# Patient Record
Sex: FEMALE | Race: WHITE | NOT HISPANIC OR LATINO | Employment: FULL TIME | URBAN - METROPOLITAN AREA
[De-identification: names, ages, dates, MRNs, and addresses within clinical notes are randomized per-mention and may not be internally consistent; named-entity substitution may affect disease eponyms.]

---

## 2024-03-01 ENCOUNTER — ANESTHESIA (OUTPATIENT)
Dept: SURGERY | Facility: MEDICAL CENTER | Age: 29
DRG: 369 | End: 2024-03-01
Payer: COMMERCIAL

## 2024-03-01 ENCOUNTER — ANESTHESIA EVENT (OUTPATIENT)
Dept: SURGERY | Facility: MEDICAL CENTER | Age: 29
DRG: 369 | End: 2024-03-01
Payer: COMMERCIAL

## 2024-03-01 ENCOUNTER — HOSPITAL ENCOUNTER (INPATIENT)
Facility: MEDICAL CENTER | Age: 29
LOS: 2 days | DRG: 369 | End: 2024-03-03
Attending: HOSPITALIST | Admitting: HOSPITALIST
Payer: COMMERCIAL

## 2024-03-01 DIAGNOSIS — K92.0 HEMATEMESIS WITH NAUSEA: ICD-10-CM

## 2024-03-01 DIAGNOSIS — R11.2 NAUSEA AND VOMITING, UNSPECIFIED VOMITING TYPE: ICD-10-CM

## 2024-03-01 DIAGNOSIS — K22.6 MALLORY-WEISS TEAR: ICD-10-CM

## 2024-03-01 PROBLEM — F20.9 SCHIZOPHRENIA (HCC): Status: ACTIVE | Noted: 2024-03-01

## 2024-03-01 PROBLEM — E87.29 HIGH ANION GAP METABOLIC ACIDOSIS: Status: ACTIVE | Noted: 2024-03-01

## 2024-03-01 PROBLEM — Z72.0 NICOTINE USE: Status: ACTIVE | Noted: 2024-03-01

## 2024-03-01 PROBLEM — D72.829 LEUKOCYTOSIS: Status: ACTIVE | Noted: 2024-03-01

## 2024-03-01 PROBLEM — R00.0 TACHYCARDIA: Status: ACTIVE | Noted: 2024-03-01

## 2024-03-01 PROBLEM — K22.3 ESOPHAGEAL RUPTURE: Status: ACTIVE | Noted: 2024-03-01

## 2024-03-01 PROBLEM — D62 ACUTE BLOOD LOSS ANEMIA: Status: ACTIVE | Noted: 2024-03-01

## 2024-03-01 PROBLEM — E83.42 HYPOMAGNESEMIA: Status: ACTIVE | Noted: 2024-03-01

## 2024-03-01 PROBLEM — R73.9 HYPERGLYCEMIA: Status: ACTIVE | Noted: 2024-03-01

## 2024-03-01 LAB
ERYTHROCYTE [DISTWIDTH] IN BLOOD BY AUTOMATED COUNT: 42.9 FL (ref 35.9–50)
ERYTHROCYTE [DISTWIDTH] IN BLOOD BY AUTOMATED COUNT: 43.4 FL (ref 35.9–50)
EST. AVERAGE GLUCOSE BLD GHB EST-MCNC: 88 MG/DL
EXTRA TUBE BLU BLU: NORMAL
HBA1C MFR BLD: 4.7 % (ref 4–5.6)
HCG UR QL: NEGATIVE
HCT VFR BLD AUTO: 29.1 % (ref 37–47)
HCT VFR BLD AUTO: 33.2 % (ref 37–47)
HGB BLD-MCNC: 10.2 G/DL (ref 12–16)
HGB BLD-MCNC: 11.6 G/DL (ref 12–16)
MCH RBC QN AUTO: 31.5 PG (ref 27–33)
MCH RBC QN AUTO: 31.7 PG (ref 27–33)
MCHC RBC AUTO-ENTMCNC: 34.9 G/DL (ref 32.2–35.5)
MCHC RBC AUTO-ENTMCNC: 35.1 G/DL (ref 32.2–35.5)
MCV RBC AUTO: 89.8 FL (ref 81.4–97.8)
MCV RBC AUTO: 90.7 FL (ref 81.4–97.8)
PLATELET # BLD AUTO: 234 K/UL (ref 164–446)
PLATELET # BLD AUTO: 239 K/UL (ref 164–446)
PMV BLD AUTO: 10.3 FL (ref 9–12.9)
PMV BLD AUTO: 10.4 FL (ref 9–12.9)
RBC # BLD AUTO: 3.24 M/UL (ref 4.2–5.4)
RBC # BLD AUTO: 3.66 M/UL (ref 4.2–5.4)
WBC # BLD AUTO: 14.1 K/UL (ref 4.8–10.8)
WBC # BLD AUTO: 23.1 K/UL (ref 4.8–10.8)

## 2024-03-01 PROCEDURE — 36415 COLL VENOUS BLD VENIPUNCTURE: CPT

## 2024-03-01 PROCEDURE — A9270 NON-COVERED ITEM OR SERVICE: HCPCS | Performed by: INTERNAL MEDICINE

## 2024-03-01 PROCEDURE — 160202 HCHG ENDO MINUTES - 1ST 30 MINS LEVEL 3: Performed by: INTERNAL MEDICINE

## 2024-03-01 PROCEDURE — 85027 COMPLETE CBC AUTOMATED: CPT

## 2024-03-01 PROCEDURE — 83036 HEMOGLOBIN GLYCOSYLATED A1C: CPT

## 2024-03-01 PROCEDURE — 160002 HCHG RECOVERY MINUTES (STAT): Performed by: INTERNAL MEDICINE

## 2024-03-01 PROCEDURE — 160048 HCHG OR STATISTICAL LEVEL 1-5: Performed by: INTERNAL MEDICINE

## 2024-03-01 PROCEDURE — 160009 HCHG ANES TIME/MIN: Performed by: INTERNAL MEDICINE

## 2024-03-01 PROCEDURE — 99222 1ST HOSP IP/OBS MODERATE 55: CPT | Mod: 25 | Performed by: INTERNAL MEDICINE

## 2024-03-01 PROCEDURE — 0DJ08ZZ INSPECTION OF UPPER INTESTINAL TRACT, VIA NATURAL OR ARTIFICIAL OPENING ENDOSCOPIC: ICD-10-PCS | Performed by: INTERNAL MEDICINE

## 2024-03-01 PROCEDURE — 160035 HCHG PACU - 1ST 60 MINS PHASE I: Performed by: INTERNAL MEDICINE

## 2024-03-01 PROCEDURE — 700105 HCHG RX REV CODE 258: Performed by: HOSPITALIST

## 2024-03-01 PROCEDURE — 700101 HCHG RX REV CODE 250: Performed by: ANESTHESIOLOGY

## 2024-03-01 PROCEDURE — 700111 HCHG RX REV CODE 636 W/ 250 OVERRIDE (IP): Mod: JZ | Performed by: HOSPITALIST

## 2024-03-01 PROCEDURE — 700111 HCHG RX REV CODE 636 W/ 250 OVERRIDE (IP): Performed by: ANESTHESIOLOGY

## 2024-03-01 PROCEDURE — C9113 INJ PANTOPRAZOLE SODIUM, VIA: HCPCS | Performed by: HOSPITALIST

## 2024-03-01 PROCEDURE — 43235 EGD DIAGNOSTIC BRUSH WASH: CPT | Performed by: INTERNAL MEDICINE

## 2024-03-01 PROCEDURE — 770020 HCHG ROOM/CARE - TELE (206)

## 2024-03-01 PROCEDURE — 700102 HCHG RX REV CODE 250 W/ 637 OVERRIDE(OP): Performed by: INTERNAL MEDICINE

## 2024-03-01 PROCEDURE — 81025 URINE PREGNANCY TEST: CPT

## 2024-03-01 PROCEDURE — 99223 1ST HOSP IP/OBS HIGH 75: CPT | Performed by: HOSPITALIST

## 2024-03-01 RX ORDER — ONDANSETRON 2 MG/ML
4 INJECTION INTRAMUSCULAR; INTRAVENOUS EVERY 4 HOURS PRN
Status: DISCONTINUED | OUTPATIENT
Start: 2024-03-01 | End: 2024-03-03 | Stop reason: HOSPADM

## 2024-03-01 RX ORDER — SODIUM CHLORIDE, SODIUM LACTATE, POTASSIUM CHLORIDE, CALCIUM CHLORIDE 600; 310; 30; 20 MG/100ML; MG/100ML; MG/100ML; MG/100ML
INJECTION, SOLUTION INTRAVENOUS CONTINUOUS
Status: ACTIVE | OUTPATIENT
Start: 2024-03-01 | End: 2024-03-01

## 2024-03-01 RX ORDER — SUCRALFATE ORAL 1 G/10ML
1 SUSPENSION ORAL EVERY 6 HOURS
Status: DISCONTINUED | OUTPATIENT
Start: 2024-03-01 | End: 2024-03-03 | Stop reason: HOSPADM

## 2024-03-01 RX ORDER — ONDANSETRON 2 MG/ML
INJECTION INTRAMUSCULAR; INTRAVENOUS PRN
Status: DISCONTINUED | OUTPATIENT
Start: 2024-03-01 | End: 2024-03-01 | Stop reason: SURG

## 2024-03-01 RX ORDER — LABETALOL HYDROCHLORIDE 5 MG/ML
5 INJECTION, SOLUTION INTRAVENOUS
Status: DISCONTINUED | OUTPATIENT
Start: 2024-03-01 | End: 2024-03-01 | Stop reason: HOSPADM

## 2024-03-01 RX ORDER — ONDANSETRON 2 MG/ML
4 INJECTION INTRAMUSCULAR; INTRAVENOUS EVERY 4 HOURS PRN
Status: DISCONTINUED | OUTPATIENT
Start: 2024-03-01 | End: 2024-03-01

## 2024-03-01 RX ORDER — DIPHENHYDRAMINE HYDROCHLORIDE 50 MG/ML
12.5 INJECTION INTRAMUSCULAR; INTRAVENOUS
Status: DISCONTINUED | OUTPATIENT
Start: 2024-03-01 | End: 2024-03-01 | Stop reason: HOSPADM

## 2024-03-01 RX ORDER — DEXAMETHASONE SODIUM PHOSPHATE 4 MG/ML
INJECTION, SOLUTION INTRA-ARTICULAR; INTRALESIONAL; INTRAMUSCULAR; INTRAVENOUS; SOFT TISSUE PRN
Status: DISCONTINUED | OUTPATIENT
Start: 2024-03-01 | End: 2024-03-01 | Stop reason: SURG

## 2024-03-01 RX ORDER — HYDRALAZINE HYDROCHLORIDE 20 MG/ML
10 INJECTION INTRAMUSCULAR; INTRAVENOUS EVERY 4 HOURS PRN
Status: DISCONTINUED | OUTPATIENT
Start: 2024-03-01 | End: 2024-03-03 | Stop reason: HOSPADM

## 2024-03-01 RX ORDER — MIDAZOLAM HYDROCHLORIDE 1 MG/ML
1 INJECTION INTRAMUSCULAR; INTRAVENOUS
Status: DISCONTINUED | OUTPATIENT
Start: 2024-03-01 | End: 2024-03-01 | Stop reason: HOSPADM

## 2024-03-01 RX ORDER — LABETALOL HYDROCHLORIDE 5 MG/ML
10 INJECTION, SOLUTION INTRAVENOUS EVERY 4 HOURS PRN
Status: ACTIVE | OUTPATIENT
Start: 2024-03-01 | End: 2024-03-01

## 2024-03-01 RX ORDER — PROCHLORPERAZINE EDISYLATE 5 MG/ML
5-10 INJECTION INTRAMUSCULAR; INTRAVENOUS EVERY 4 HOURS PRN
Status: DISCONTINUED | OUTPATIENT
Start: 2024-03-01 | End: 2024-03-03 | Stop reason: HOSPADM

## 2024-03-01 RX ORDER — OXYCODONE HCL 5 MG/5 ML
5 SOLUTION, ORAL ORAL
Status: DISCONTINUED | OUTPATIENT
Start: 2024-03-01 | End: 2024-03-01 | Stop reason: HOSPADM

## 2024-03-01 RX ORDER — LIDOCAINE HYDROCHLORIDE 20 MG/ML
INJECTION, SOLUTION EPIDURAL; INFILTRATION; INTRACAUDAL; PERINEURAL PRN
Status: DISCONTINUED | OUTPATIENT
Start: 2024-03-01 | End: 2024-03-01 | Stop reason: SURG

## 2024-03-01 RX ORDER — PROMETHAZINE HYDROCHLORIDE 12.5 MG/1
12.5-25 SUPPOSITORY RECTAL EVERY 4 HOURS PRN
Status: DISCONTINUED | OUTPATIENT
Start: 2024-03-01 | End: 2024-03-03 | Stop reason: HOSPADM

## 2024-03-01 RX ORDER — SODIUM CHLORIDE, SODIUM LACTATE, POTASSIUM CHLORIDE, CALCIUM CHLORIDE 600; 310; 30; 20 MG/100ML; MG/100ML; MG/100ML; MG/100ML
INJECTION, SOLUTION INTRAVENOUS CONTINUOUS
Status: DISCONTINUED | OUTPATIENT
Start: 2024-03-01 | End: 2024-03-01 | Stop reason: HOSPADM

## 2024-03-01 RX ORDER — SODIUM CHLORIDE 9 MG/ML
INJECTION, SOLUTION INTRAVENOUS CONTINUOUS
Status: DISCONTINUED | OUTPATIENT
Start: 2024-03-01 | End: 2024-03-02

## 2024-03-01 RX ORDER — ONDANSETRON 2 MG/ML
4 INJECTION INTRAMUSCULAR; INTRAVENOUS
Status: DISCONTINUED | OUTPATIENT
Start: 2024-03-01 | End: 2024-03-01 | Stop reason: HOSPADM

## 2024-03-01 RX ORDER — ONDANSETRON 4 MG/1
4 TABLET, ORALLY DISINTEGRATING ORAL EVERY 4 HOURS PRN
Status: DISCONTINUED | OUTPATIENT
Start: 2024-03-01 | End: 2024-03-03 | Stop reason: HOSPADM

## 2024-03-01 RX ORDER — OXYCODONE HCL 5 MG/5 ML
10 SOLUTION, ORAL ORAL
Status: DISCONTINUED | OUTPATIENT
Start: 2024-03-01 | End: 2024-03-01 | Stop reason: HOSPADM

## 2024-03-01 RX ORDER — EPHEDRINE SULFATE 50 MG/ML
5 INJECTION, SOLUTION INTRAVENOUS
Status: DISCONTINUED | OUTPATIENT
Start: 2024-03-01 | End: 2024-03-01 | Stop reason: HOSPADM

## 2024-03-01 RX ORDER — MAGNESIUM SULFATE HEPTAHYDRATE 40 MG/ML
2 INJECTION, SOLUTION INTRAVENOUS ONCE
Status: COMPLETED | OUTPATIENT
Start: 2024-03-01 | End: 2024-03-01

## 2024-03-01 RX ORDER — HYDRALAZINE HYDROCHLORIDE 20 MG/ML
5 INJECTION INTRAMUSCULAR; INTRAVENOUS
Status: DISCONTINUED | OUTPATIENT
Start: 2024-03-01 | End: 2024-03-01 | Stop reason: HOSPADM

## 2024-03-01 RX ORDER — MIDAZOLAM HYDROCHLORIDE 1 MG/ML
INJECTION INTRAMUSCULAR; INTRAVENOUS PRN
Status: DISCONTINUED | OUTPATIENT
Start: 2024-03-01 | End: 2024-03-01 | Stop reason: SURG

## 2024-03-01 RX ORDER — MEPERIDINE HYDROCHLORIDE 25 MG/ML
6.25 INJECTION INTRAMUSCULAR; INTRAVENOUS; SUBCUTANEOUS
Status: DISCONTINUED | OUTPATIENT
Start: 2024-03-01 | End: 2024-03-01 | Stop reason: HOSPADM

## 2024-03-01 RX ORDER — ROCURONIUM BROMIDE 10 MG/ML
INJECTION, SOLUTION INTRAVENOUS PRN
Status: DISCONTINUED | OUTPATIENT
Start: 2024-03-01 | End: 2024-03-01 | Stop reason: SURG

## 2024-03-01 RX ADMIN — PROPOFOL 200 MG: 10 INJECTION, EMULSION INTRAVENOUS at 16:43

## 2024-03-01 RX ADMIN — PIPERACILLIN AND TAZOBACTAM 3.38 G: 3; .375 INJECTION, POWDER, FOR SOLUTION INTRAVENOUS at 15:13

## 2024-03-01 RX ADMIN — LIDOCAINE HYDROCHLORIDE 50 MG: 20 INJECTION, SOLUTION EPIDURAL; INFILTRATION; INTRACAUDAL at 16:43

## 2024-03-01 RX ADMIN — FENTANYL CITRATE 50 MCG: 50 INJECTION, SOLUTION INTRAMUSCULAR; INTRAVENOUS at 16:43

## 2024-03-01 RX ADMIN — MIDAZOLAM HYDROCHLORIDE 2 MG: 1 INJECTION, SOLUTION INTRAMUSCULAR; INTRAVENOUS at 16:37

## 2024-03-01 RX ADMIN — SODIUM CHLORIDE: 9 INJECTION, SOLUTION INTRAVENOUS at 14:55

## 2024-03-01 RX ADMIN — MAGNESIUM SULFATE HEPTAHYDRATE 2 G: 2 INJECTION, SOLUTION INTRAVENOUS at 15:26

## 2024-03-01 RX ADMIN — DEXAMETHASONE SODIUM PHOSPHATE 8 MG: 4 INJECTION INTRA-ARTICULAR; INTRALESIONAL; INTRAMUSCULAR; INTRAVENOUS; SOFT TISSUE at 16:44

## 2024-03-01 RX ADMIN — PANTOPRAZOLE SODIUM 8 MG/HR: 40 INJECTION, POWDER, FOR SOLUTION INTRAVENOUS at 14:58

## 2024-03-01 RX ADMIN — ROCURONIUM BROMIDE 30 MG: 50 INJECTION, SOLUTION INTRAVENOUS at 16:43

## 2024-03-01 RX ADMIN — ONDANSETRON 4 MG: 2 INJECTION INTRAMUSCULAR; INTRAVENOUS at 16:44

## 2024-03-01 RX ADMIN — SUCRALFATE ORAL 1 G: 1 SUSPENSION ORAL at 18:34

## 2024-03-01 ASSESSMENT — LIFESTYLE VARIABLES
TOTAL SCORE: 0
HAVE YOU EVER FELT YOU SHOULD CUT DOWN ON YOUR DRINKING: NO
AVERAGE NUMBER OF DAYS PER WEEK YOU HAVE A DRINK CONTAINING ALCOHOL: 1
EVER HAD A DRINK FIRST THING IN THE MORNING TO STEADY YOUR NERVES TO GET RID OF A HANGOVER: NO
HAVE PEOPLE ANNOYED YOU BY CRITICIZING YOUR DRINKING: NO
EVER FELT BAD OR GUILTY ABOUT YOUR DRINKING: NO
CONSUMPTION TOTAL: POSITIVE
TOTAL SCORE: 0
HOW MANY TIMES IN THE PAST YEAR HAVE YOU HAD 5 OR MORE DRINKS IN A DAY: 3
DOES PATIENT WANT TO STOP DRINKING: NO
ALCOHOL_USE: YES
ON A TYPICAL DAY WHEN YOU DRINK ALCOHOL HOW MANY DRINKS DO YOU HAVE: 2
TOTAL SCORE: 0
SUBSTANCE_ABUSE: 0

## 2024-03-01 ASSESSMENT — ENCOUNTER SYMPTOMS
PHOTOPHOBIA: 0
TINGLING: 0
DOUBLE VISION: 0
EYE DISCHARGE: 0
RESPIRATORY NEGATIVE: 1
BACK PAIN: 0
CHILLS: 0
SHORTNESS OF BREATH: 0
MUSCULOSKELETAL NEGATIVE: 1
PSYCHIATRIC NEGATIVE: 1
SPUTUM PRODUCTION: 0
CONSTITUTIONAL NEGATIVE: 1
BLOOD IN STOOL: 0
PND: 0
ORTHOPNEA: 0
FEVER: 0
POLYDIPSIA: 0
NEUROLOGICAL NEGATIVE: 1
DIZZINESS: 0
MYALGIAS: 0
SINUS PAIN: 0
NAUSEA: 1
WHEEZING: 0
DIAPHORESIS: 0
FALLS: 0
SENSORY CHANGE: 0
STRIDOR: 0
HEARTBURN: 1
EYES NEGATIVE: 1
COUGH: 0
LOSS OF CONSCIOUSNESS: 0
BRUISES/BLEEDS EASILY: 0
FLANK PAIN: 0
VOMITING: 1
INSOMNIA: 0
EYE REDNESS: 0
CARDIOVASCULAR NEGATIVE: 1
SEIZURES: 0
NECK PAIN: 1
DIZZINESS: 1
ABDOMINAL PAIN: 0
DEPRESSION: 0
FOCAL WEAKNESS: 0

## 2024-03-01 ASSESSMENT — COGNITIVE AND FUNCTIONAL STATUS - GENERAL
DAILY ACTIVITIY SCORE: 24
SUGGESTED CMS G CODE MODIFIER DAILY ACTIVITY: CH
MOBILITY SCORE: 24
SUGGESTED CMS G CODE MODIFIER MOBILITY: CH

## 2024-03-01 ASSESSMENT — PAIN DESCRIPTION - PAIN TYPE
TYPE: SURGICAL PAIN
TYPE: ACUTE PAIN

## 2024-03-01 ASSESSMENT — PATIENT HEALTH QUESTIONNAIRE - PHQ9
2. FEELING DOWN, DEPRESSED, IRRITABLE, OR HOPELESS: NOT AT ALL
1. LITTLE INTEREST OR PLEASURE IN DOING THINGS: NOT AT ALL
SUM OF ALL RESPONSES TO PHQ9 QUESTIONS 1 AND 2: 0

## 2024-03-01 ASSESSMENT — VISUAL ACUITY: OU: 1

## 2024-03-01 NOTE — ASSESSMENT & PLAN NOTE
Leukocytosis of 22,000  This could be from a stress response simultaneously with an ulcer versus a Raina-Tamayo tear.  Was started on Zosyn and monitor white blood cell count  Follow-up on blood cultures [place] [facility] [place]  Dc antibiotics, no other signs of infection, suspect reactive

## 2024-03-01 NOTE — ANESTHESIA PREPROCEDURE EVALUATION
Case: [id number] Date/Time: 03/01/24 1600    Procedure: GASTROSCOPY (Esophagus)    Anesthesia type: MAC    Pre-op diagnosis: GI bleed    Location: [facility] ROOM 26 / SURGERY SAME DAY [facility]    Surgeons: Mervat Mcdonald M.D.            Relevant Problems   ANESTHESIA (within normal limits)      PULMONARY (within normal limits)      NEURO (within normal limits)      CARDIAC (within normal limits)      GI (within normal limits)       (within normal limits)      ENDO (within normal limits)      Other   (positive) Acute blood loss anemia   (positive) Esophageal rupture   (positive) Hematemesis   (positive) High anion gap metabolic acidosis   (positive) Leukocytosis   (positive) Nausea and vomiting   (positive) Nicotine use   (positive) Schizophrenia (HCC)   (positive) Tachycardia       Physical Exam    Airway   Mallampati: II  TM distance: >3 FB  Neck ROM: full       Cardiovascular - normal exam  Rhythm: regular  Rate: normal  (-) murmur     Dental - normal exam           Pulmonary - normal exam  Breath sounds clear to auscultation     Abdominal    Neurological - normal exam                   Anesthesia Plan    ASA 2- EMERGENT   ASA physical status emergent criteria: acute hemorrhage    Plan - general       Airway plan will be ETT          Induction: intravenous    Postoperative Plan: Postoperative administration of opioids is intended.    Pertinent diagnostic labs and testing reviewed    Informed Consent:    Anesthetic plan and risks discussed with patient.    Use of blood products discussed with: patient whom consented to blood products.

## 2024-03-01 NOTE — CONSULTS
Gastroenterology Initial Consult Note               Author:  Mervat Mcdonald M.D. Date & Time Created: 3/1/2024 1:47 PM       Patient ID:  Name:             Sunita Thornton    YOB: 1995  Age:                 28 y.o.  female  MRN:               [id number]      Referring Provider:  Tim Gonzalez MD        Presenting Chief Complaint:  GI bleed       History of Present Illness:    This is a very pleasant 28 y.o. female presented to [facility] [place] this am with vomiting overnight.  She was eating lunch yesterday and then vomited--not violently.  Shortly after, she had hematemesis about every 2 hours.  Around midnight she had melena.  She denies epigastric tenderness.  This morning she was dizzy and elected to go to the ER.  On the way she had another hematemesis.  She has been NPO since 0930.  In the ER, She was found to have tachycardia to 140 and BP 85/46.  She was given 1 L NS and 1 unit PRBC.  Hgb 11.6, BUN 39      She has no chest or abd pain.  She takes no NSAIDs.  No change in appetite or early satiety.  She does admit to worsening GERD but hasn't been taking any thing.  She drinks alcohol infrequently.    Review of Systems:  Review of Systems   Constitutional:  Positive for malaise/fatigue.   HENT: Negative.     Eyes: Negative.    Respiratory: Negative.     Cardiovascular: Negative.    Gastrointestinal:  Positive for heartburn, melena, nausea and vomiting.   Genitourinary: Negative.    Musculoskeletal: Negative.    Skin: Negative.    Neurological:  Positive for dizziness. Negative for seizures and loss of consciousness.   Endo/Heme/Allergies: Negative.              Past Medical History:  No past medical history on file.  Active Hospital Problems    Diagnosis     Esophageal rupture [K22.3]     Nausea and vomiting [R11.2]     Hematemesis [K92.0]     Schizophrenia (HCC) [F20.9]     Leukocytosis [D72.829]     Tachycardia [R00.0]     Hypomagnesemia [E83.42]     High anion gap metabolic acidosis  [E87.29]     Hyperglycemia [R73.9]     Acute blood loss anemia [D62]     Nicotine use [Z72.0]          Past Surgical History:  No past surgical history on file.        Hospital Medications:  Current Facility-Administered Medications   Medication Dose Frequency Provider Last Rate Last Admin    labetalol (Normodyne/Trandate) injection 10 mg  10 mg Q4HRS PRN Dave Gonzalez M.D.        NS infusion   Continuous Dave Gonzalez M.D.        hydrALAZINE (Apresoline) injection 10 mg  10 mg Q4HRS PRN Dave Gonzalez M.D.        ondansetron (Zofran) syringe/vial injection 4 mg  4 mg Q4HRS PRN Dave Gonzalez M.D.        ondansetron (Zofran ODT) dispertab 4 mg  4 mg Q4HRS PRN Dave Gonzalez M.D.        promethazine (Phenergan) suppository 12.5-25 mg  12.5-25 mg Q4HRS PRN Dave Gonzalez M.D.        prochlorperazine (Compazine) injection 5-10 mg  5-10 mg Q4HRS PRN Dave Gonzalez M.D.        fentaNYL (Sublimaze) injection 50 mcg  50 mcg Q HOUR PRN Dave Gonzalez M.D.        fentaNYL (Sublimaze) injection 25 mcg  25 mcg Q HOUR PRN Dave Gonzalez M.D.        pantoprazole (Protonix) 80 mg in  mL continuous infusion  8 mg/hr Continuous Dave Gonzalez M.D.        [START ON 3/2/2024] cariprazine (Vraylar) capsule 3 mg  3 mg DAILY Dave Gonzalez M.D.        magnesium sulfate IVPB premix 2 g  2 g Once Dave Gonzalez M.D.        piperacillin-tazobactam (Zosyn) 3.375 g in  mL IVPB  3.375 g Once Dave Gonzalez M.D.        And    piperacillin-tazobactam (Zosyn) 3.375 g in  mL IVPB  3.375 g Q8HRS Dave Gonzalez M.D.       Last reviewed on 3/1/2024  1:09 PM by Dave Gonzalez M.D.       Current Outpatient Medications:  Medications Prior to Admission   Medication Sig Dispense Refill Last Dose    cariprazine (VRAYLAR) 3 MG capsule Take 3 mg by mouth every day.            Medication Allergies:  Not on File      Family Medical History:  No family history on file.      Social History:  Social History     Socioeconomic History     Marital status: Not on file     Spouse name: Not on file    Number of children: Not on file    Years of education: Not on file    Highest education level: Not on file   Occupational History    Not on file   Tobacco Use    Smoking status: Not on file    Smokeless tobacco: Not on file   Substance and Sexual Activity    Alcohol use: Not on file    Drug use: Not on file    Sexual activity: Not on file   Other Topics Concern    Not on file   Social History Narrative    Not on file     Social Determinants of Health     Financial Resource Strain: Not on file   Food Insecurity: Not on file   Transportation Needs: Not on file   Physical Activity: Not on file   Stress: Not on file   Social Connections: Not on file   Intimate Partner Violence: Not on file   Housing Stability: Not on file         Vital signs:  Weight/BMI: There is no height or weight on file to calculate BMI.  BP (!) 141/84   Pulse 95   Temp 36.7 °C (98.1 °F) (Temporal)   Resp 18   Wt 74.2 kg (163 lb 9.3 oz)   SpO2 99%   Vitals:    03/01/24 1312 03/01/24 1319   BP:  (!) 141/84   Pulse:  95   Resp:  18   Temp:  36.7 °C (98.1 °F)   TempSrc:  Temporal   SpO2:  99%   Weight: 74.2 kg (163 lb 9.3 oz)      Oxygen Therapy:  Pulse Oximetry: 99 %, O2 (LPM): 0, O2 Delivery Device: None - Room Air    Intake/Output Summary (Last 24 hours) at 3/1/2024 1347  Last data filed at 3/1/2024 1319  Gross per 24 hour   Intake --   Output 450 ml   Net -450 ml         Physical Exam:  Physical Exam  Constitutional:       Appearance: Normal appearance.   HENT:      Head: Normocephalic and atraumatic.      Nose: Nose normal.   Eyes:      General: No scleral icterus.     Pupils: Pupils are equal, round, and reactive to light.   Cardiovascular:      Rate and Rhythm: Regular rhythm.      Heart sounds: Normal heart sounds.   Pulmonary:      Effort: Pulmonary effort is normal.      Breath sounds: Normal breath sounds.   Abdominal:      General: Bowel sounds are normal.      Palpations:  "Abdomen is soft.      Tenderness: There is no abdominal tenderness.   Musculoskeletal:         General: Normal range of motion.      Cervical back: Normal range of motion and neck supple.   Skin:     General: Skin is warm and dry.   Neurological:      Mental Status: She is alert and oriented to person, place, and time.                 Labs:  No results for input(s): \"SODIUM\", \"POTASSIUM\", \"CHLORIDE\", \"CO2\", \"BUN\", \"CREATININE\", \"MAGNESIUM\", \"PHOSPHORUS\", \"CALCIUM\" in the last 72 hours.  No results for input(s): \"ALTSGPT\", \"ASTSGOT\", \"ALKPHOSPHAT\", \"TBILIRUBIN\", \"DBILIRUBIN\", \"GAMMAGT\", \"AMYLASE\", \"LIPASE\", \"ALB\", \"PREALBUMIN\", \"GLUCOSE\" in the last 72 hours.      No results for input(s): \"RBC\", \"HEMOGLOBIN\", \"HEMATOCRIT\", \"PLATELETCT\", \"PROTHROMBTM\", \"APTT\", \"INR\", \"IRON\", \"FERRITIN\", \"TOTIRONBC\" in the last 72 hours.  No results found for this or any previous visit (from the past 24 hour(s)).      Radiology Review:  No orders to display         MDM (Data Review):   -Records reviewed and summarized in current documentation  -I personally reviewed and interpreted the laboratory results  -I personally reviewed the radiology images    Assessment/Recommendations:    Hematemesis:  ? Due to GERD  Acute blood loss anemia  GERD  Schizophrenia    Recs:  NPO  IVF  IV Protonix  EGD today.          Mervat Mcdonald M.D.          Core Quality Measures   Reviewed items:  Labs, Medications and Radiology reports reviewed         "

## 2024-03-01 NOTE — ASSESSMENT & PLAN NOTE
Profound nausea vomiting that happened yesterday.  She says she has been vomiting all night long at least 10 times she has vomited every time it is blood.  Antiemetic management  Tolerating full liquid diet, stop IVF

## 2024-03-01 NOTE — ASSESSMENT & PLAN NOTE
Secondary to the hematemesis patient has developed anemia   Continue to monitor H/H   Hgb ~9.5, stable x 2

## 2024-03-01 NOTE — ASSESSMENT & PLAN NOTE
Discussed nicotine use with her.  Patient says she vapes all the time.  She says she has been trying to cut back  Discussed nicotine replacement protocol with her currently she is not interested.  Discussed medication management for withdrawal as she states she is not interested at this time  Discussed acupuncture and she said most likely her insurance is not going to cover that.  Also the place she lives and has minimal resources.

## 2024-03-01 NOTE — ASSESSMENT & PLAN NOTE
Suspected esophageal rupture although this could be from gastric ulcer disease as well.  Patient tells me she has had heartburn for the past 2 months  She reports sudden vomiting of blood yesterday that came after she ate some tacos.  Patient has a not reported vomiting prior to this and did not report had any vomiting episodes.  H&H stable ~9  EGD without rupture, Raina-Tamayo tear at GE junction   PPI BID, full liquid diet and Carafate slurry

## 2024-03-01 NOTE — ASSESSMENT & PLAN NOTE
Tachycardia most likely SIRS response with elevated white blood cell count  Initiateed antibiotics and fluid resuscitation  HR stable   Stop IVF, no signs of infection, stop antibiotics and monitor

## 2024-03-01 NOTE — ASSESSMENT & PLAN NOTE
Magnesium 1.6 at the sending facility and thus we will give her 2 g of IV magnesium recheck magnesium level in the morning.

## 2024-03-01 NOTE — PROGRESS NOTES
[facility] TRI[facility] OFFICER CONSULT REQUEST REPORT  Consult requested by: Dr Acharya  Reason for consult: Suspected esophageal rupture with severe nausea vomiting  Is consult for transition of care: Yes  Pertinent history/hospital Course: Patient with no significant alcohol history, no tobacco history, no drug history has started to have severe nausea vomiting.  The patient afterwards started to vomit blood.  Blood counts at this point have decreased but are still stable at 11.  Patient suspected to have esophageal rupture with white blood cell count up to 22,000 and a heart rate up to 120.  Patient will be placed on broad-spectrum antibiotics with blood cultures being taken prior to transfer as discussed with the transferring physician.  GI has been consulted, Dr. Mcdonald to see the patient on arrival.  Patient currently on Protonix drip and octreotide drip.  Code Status: Full code  Can the hospitalist sign off upon completion of required consult/outcomes requested: No   Assigned hospitalist: Call transfer center upon arrival for assignment    For any question or concerns regarding the care of this patient, please reach out to the assigned hospitalist.

## 2024-03-01 NOTE — ASSESSMENT & PLAN NOTE
Sudden onset of hematemesis prior to arrival   Patient tells me she has had heartburn for several months  S/p EGD with mauro canales   Stop PPI infusion, start IV PPI BID   Full liquid diet, carafate slurry and only crushed meds, discussed with GI  Monitor H/H, stable ~9.5

## 2024-03-01 NOTE — ASSESSMENT & PLAN NOTE
Secondary to the nausea vomiting patient has become acidotic and thus has developed metabolic acidosis with high anion gap  Monitor bicarb level as well as anion gap level and give fluid resuscitation  Hyperchloremic acidosis   Stop NS, pt tolerating full liquid diet   Repeat BMP in am

## 2024-03-01 NOTE — H&P
Hospital Medicine History & Physical Note    Date of Service  3/1/2024    Primary Care Physician  No primary care provider on file.    Consultants  GI    Specialist Names: Dr. Mcdonald    Code Status  Full Code    Chief Complaint  No chief complaint on file.      History of Presenting Illness  Sunita Thornton is a 28 y.o. female who presented 3/1/2024 on transfer from [facility] after the patient reported there for hematemesis.  The patient had some tacos last night right after which she vomited blood.  During the night she vomited at least 9 times she says every time it is blood.  He said there was more blood in the toilet and water.  She has no pain with it.  She has been having gastric complaints with heartburn over the past 2 months.  Certainly the potential for having gastric ulcers is there.  Patient does not drink alcohol.  She does not use NSAIDs.  He only takes 1 medication for schizophrenia.  That is Vraylar.  Side effects of this medication can be dyspepsia, nausea, vomiting.  Patient at this point has been started on Protonix drip.  She will continue with fluid resuscitation given her metabolic acidosis with it.  She has a white blood cell count of 22,000 and thus we will initiate her on antibiotics with Zosyn to cover any potential leak.  Patient will need an EGD as discussed with GI.  N.p.o. for now and patient may need to go for EGD this afternoon.  At the sending facility patient was stabilized with 1 L of fluid, 1 unit of packed red blood cells, she did not have any imaging done, she did have laboratory evaluations done, blood cultures were taken and she was started on antibiotics and Protonix.    I discussed the plan of care with patient, bedside RN, and transferring physician and gastroenterology. .    Review of Systems  Review of Systems   Constitutional: Negative.  Negative for chills, diaphoresis and fever.   HENT: Negative.  Negative for nosebleeds and sinus pain.    Eyes:  Negative.  Negative for double vision, photophobia, discharge and redness.   Respiratory: Negative.  Negative for cough, sputum production, shortness of breath, wheezing and stridor.    Cardiovascular: Negative.  Negative for chest pain, orthopnea, leg swelling and PND.   Gastrointestinal:  Positive for heartburn, nausea and vomiting. Negative for abdominal pain and blood in stool.   Genitourinary: Negative.  Negative for dysuria, flank pain and frequency.   Musculoskeletal:  Positive for neck pain. Negative for back pain, falls and myalgias.   Skin: Negative.  Negative for itching.   Neurological: Negative.  Negative for dizziness, tingling, sensory change, focal weakness and seizures.   Endo/Heme/Allergies: Negative.  Negative for polydipsia. Does not bruise/bleed easily.   Psychiatric/Behavioral: Negative.  Negative for depression, substance abuse and suicidal ideas. The patient does not have insomnia.    All other systems reviewed and are negative.      Past Medical History  Schizophrenia    Surgical History  No previous surgeries    Family History  family history is not on file.   Family history reviewed with patient. There is no family history that is pertinent to the chief complaint.     Social History  Patient vapes  No alcohol use  No recreational drug use  Full CODE STATUS    Allergies  No allergies    Medications  Prior to Admission Medications   Prescriptions Last Dose Informant Patient Reported? Taking?   cariprazine (VRAYLAR) 3 MG capsule   Yes Yes   Sig: Take 3 mg by mouth every day.      Facility-Administered Medications: None       Physical Exam  Temp:  [36.7 °C (98.1 °F)] 36.7 °C (98.1 °F)  Pulse:  [95] 95  Resp:  [18] 18  BP: (141)/(84) 141/84  SpO2:  [99 %] 99 %  Blood Pressure: (!) 141/84   Temperature: 36.7 °C (98.1 °F)   Pulse: 95   Respiration: 18   Pulse Oximetry: 99 %       Physical Exam  Vitals and nursing note reviewed. Exam conducted with a chaperone present.   Constitutional:        General: She is awake. She is not in acute distress.     Appearance: Normal appearance. She is well-developed, well-groomed and normal weight. She is not ill-appearing.   HENT:      Head: Normocephalic and atraumatic.      Jaw: There is normal jaw occlusion. No trismus.      Salivary Glands: Right salivary gland is not tender. Left salivary gland is not tender.      Right Ear: External ear normal. There is no impacted cerumen.      Left Ear: External ear normal. There is no impacted cerumen.      Nose: Nose normal.      Mouth/Throat:      Mouth: Mucous membranes are dry.      Pharynx: Oropharynx is clear.   Eyes:      General: Lids are normal. Vision grossly intact.      Extraocular Movements: Extraocular movements intact.      Conjunctiva/sclera: Conjunctivae normal.      Right eye: Right conjunctiva is not injected. No exudate.     Left eye: Left conjunctiva is not injected. No exudate.     Pupils: Pupils are equal, round, and reactive to light.   Neck:      Thyroid: No thyroid mass.      Vascular: No hepatojugular reflux or JVD.      Trachea: No abnormal tracheal secretions or tracheal deviation.   Cardiovascular:      Rate and Rhythm: Regular rhythm. Tachycardia present. Occasional Extrasystoles are present.     Pulses: Normal pulses.      Heart sounds: Normal heart sounds. No murmur heard.     No friction rub.   Pulmonary:      Effort: Pulmonary effort is normal.      Breath sounds: Normal breath sounds. No wheezing or rhonchi.   Abdominal:      General: Abdomen is flat. Bowel sounds are normal.      Palpations: Abdomen is soft.      Tenderness: There is no abdominal tenderness. There is no right CVA tenderness or left CVA tenderness.      Hernia: No hernia is present.   Musculoskeletal:         General: Normal range of motion.      Cervical back: Full passive range of motion without pain, normal range of motion and neck supple. No rigidity. No muscular tenderness.      Right lower leg: No edema.      Left  lower leg: No edema.   Lymphadenopathy:      Head:      Right side of head: No submental adenopathy.      Left side of head: No submental adenopathy.      Cervical:      Right cervical: No superficial cervical adenopathy.     Left cervical: No superficial cervical adenopathy.      Upper Body:      Right upper body: No supraclavicular adenopathy.      Left upper body: No supraclavicular adenopathy.   Skin:     General: Skin is warm and dry.      Capillary Refill: Capillary refill takes less than 2 seconds.      Coloration: Skin is not cyanotic or pale.      Findings: No abrasion or bruising.   Neurological:      General: No focal deficit present.      Mental Status: She is alert and oriented to person, place, and time. Mental status is at baseline.      GCS: GCS eye subscore is 4. GCS verbal subscore is 5. GCS motor subscore is 6.      Cranial Nerves: No cranial nerve deficit.      Sensory: No sensory deficit.      Motor: Motor function is intact.      Deep Tendon Reflexes:      Reflex Scores:       Tricep reflexes are 2+ on the right side and 2+ on the left side.       Bicep reflexes are 2+ on the right side and 2+ on the left side.       Brachioradialis reflexes are 2+ on the right side and 2+ on the left side.       Patellar reflexes are 2+ on the right side and 2+ on the left side.       Achilles reflexes are 2+ on the right side and 2+ on the left side.  Psychiatric:         Attention and Perception: Attention and perception normal.         Mood and Affect: Mood normal.         Speech: Speech normal.         Behavior: Behavior normal. Behavior is cooperative.         Thought Content: Thought content normal.         Cognition and Memory: Cognition and memory normal.         Judgment: Judgment normal.         Laboratory:  White blood cell count 22,520  Hemoglobin 11.6 hematocrit 34.9  Platelets 360  Sodium 142 potassium 3.5, chloride 107, CO2 19, glucose 165, BUN 39 creatinine 1.06, anion gap 19.5, total bilirubin  0.4 with ALT 58 SGOT 13 SGPT 32 calcium 8.8 magnesium 1.6 phosphorus 3.7    Imaging:  No orders to display       X-Ray:  I have personally reviewed the images and compared with prior images.  EKG:  I have personally reviewed the images and compared with prior images.    Assessment/Plan:  Justification for Admission Status  I anticipate this patient will require at least two midnights for appropriate medical management, necessitating inpatient admission because patient at this point has hematemesis with possible gastric ulcer versus esophageal rupture and will require least 48 hours of inpatient management.    Patient will need a Telemetry bed on MEDICAL service .  The need is secondary to hematemesis secondary to either gastric ulcer or esophageal rupture..    * Esophageal rupture- (present on admission)  Assessment & Plan  Suspected esophageal rupture although this could be from gastric ulcer disease as well.  Patient tells me she has had heartburn for the past 2 months  She reports sudden vomiting of blood yesterday that came after she ate some tacos.  Patient has a not reported vomiting prior to this and did not report had any vomiting episodes.  H&H slightly dropped  GI consulted will need EGD to differentiate between ulcer possible Raina-Tamayo tear.    Leukocytosis- (present on admission)  Assessment & Plan  Leukocytosis of 22,000  Discussed with sending physician to obtain blood cultures and initiate broad-spectrum antibiotics which have been initiated.  This could be from a stress response simultaneously with an ulcer versus a Raina-Tamayo tear.  For now we will initiate Zosyn and monitor white blood cell count  Follow-up on blood cultures from [facility]    Hematemesis- (present on admission)  Assessment & Plan  Sudden onset of hematemesis yesterday.  Patient tells me she has had heartburn for several months  I Protonix started 8 mg/h infusion.  No indication for octreotide at this point in  time, patient does not use alcohol does not use recreational drugs.  EGD to be performed  N.p.o. for now  Fluid resuscitation  Pain management as needed  Currently pain-free    Nausea and vomiting- (present on admission)  Assessment & Plan  Profound nausea vomiting that happened yesterday.  She says she has been vomiting all night long at least 10 times she has vomited every time it is blood.  Antiemetic management  Fluid resuscitation  EGD to be performed later today  N.p.o. for now    Acute blood loss anemia- (present on admission)  Assessment & Plan  Secondary to the hematemesis patient has developed anemia and will continue to monitor H&H and obtain a TEG exam.    Hyperglycemia- (present on admission)  Assessment & Plan  Hyperglycemia secondary to stress response  Check hemoglobin A1c level    High anion gap metabolic acidosis- (present on admission)  Assessment & Plan  Secondary to the nausea vomiting patient has become acidotic and thus has developed metabolic acidosis with high anion gap  Monitor bicarb level as well as anion gap level and give fluid resuscitation  If necessary will initiate bicarb for now we will try to resuscitate her with fluid before initiating bicarb drip.    Hypomagnesemia- (present on admission)  Assessment & Plan  Magnesium 1.6 at the sending facility and thus we will give her 2 g of IV magnesium recheck magnesium level in the morning.    Tachycardia- (present on admission)  Assessment & Plan  Tachycardia most likely SIRS response with elevated white blood cell count  Initiate antibiotics and fluid resuscitation    Nicotine use- (present on admission)  Assessment & Plan  Discussed nicotine use with her.  Patient says she vapes all the time.  She says she has been trying to cut back  Discussed nicotine replacement protocol with her currently she is not interested.  Discussed medication management for withdrawal as she states she is not interested at this time  Discussed acupuncture and  she said most likely her insurance is not going to cover that.  Also the place she lives and has minimal resources.    Schizophrenia (HCC)- (present on admission)  Assessment & Plan  Patient takes Vraylar 3 mg daily, we will hold today's dose and resume tomorrow once she is EGD.        VTE prophylaxis: SCDs/TEDs

## 2024-03-02 LAB
ANION GAP SERPL CALC-SCNC: 11 MMOL/L (ref 7–16)
BUN SERPL-MCNC: 10 MG/DL (ref 8–22)
CALCIUM SERPL-MCNC: 7.6 MG/DL (ref 8.5–10.5)
CHLORIDE SERPL-SCNC: 115 MMOL/L (ref 96–112)
CO2 SERPL-SCNC: 16 MMOL/L (ref 20–33)
CREAT SERPL-MCNC: 0.53 MG/DL (ref 0.5–1.4)
ERYTHROCYTE [DISTWIDTH] IN BLOOD BY AUTOMATED COUNT: 45 FL (ref 35.9–50)
ERYTHROCYTE [DISTWIDTH] IN BLOOD BY AUTOMATED COUNT: 45.4 FL (ref 35.9–50)
GFR SERPLBLD CREATININE-BSD FMLA CKD-EPI: 130 ML/MIN/1.73 M 2
GLUCOSE SERPL-MCNC: 118 MG/DL (ref 65–99)
HCT VFR BLD AUTO: 27 % (ref 37–47)
HCT VFR BLD AUTO: 29.1 % (ref 37–47)
HGB BLD-MCNC: 9.2 G/DL (ref 12–16)
HGB BLD-MCNC: 9.5 G/DL (ref 12–16)
MAGNESIUM SERPL-MCNC: 2.2 MG/DL (ref 1.5–2.5)
MCH RBC QN AUTO: 30.6 PG (ref 27–33)
MCH RBC QN AUTO: 31.7 PG (ref 27–33)
MCHC RBC AUTO-ENTMCNC: 32.6 G/DL (ref 32.2–35.5)
MCHC RBC AUTO-ENTMCNC: 34.1 G/DL (ref 32.2–35.5)
MCV RBC AUTO: 93.1 FL (ref 81.4–97.8)
MCV RBC AUTO: 93.9 FL (ref 81.4–97.8)
PLATELET # BLD AUTO: 192 K/UL (ref 164–446)
PLATELET # BLD AUTO: 219 K/UL (ref 164–446)
PMV BLD AUTO: 10.5 FL (ref 9–12.9)
PMV BLD AUTO: 10.7 FL (ref 9–12.9)
POTASSIUM SERPL-SCNC: 4 MMOL/L (ref 3.6–5.5)
RBC # BLD AUTO: 2.9 M/UL (ref 4.2–5.4)
RBC # BLD AUTO: 3.1 M/UL (ref 4.2–5.4)
SODIUM SERPL-SCNC: 142 MMOL/L (ref 135–145)
WBC # BLD AUTO: 10.4 K/UL (ref 4.8–10.8)
WBC # BLD AUTO: 11.6 K/UL (ref 4.8–10.8)

## 2024-03-02 PROCEDURE — C9113 INJ PANTOPRAZOLE SODIUM, VIA: HCPCS | Performed by: HOSPITALIST

## 2024-03-02 PROCEDURE — 700111 HCHG RX REV CODE 636 W/ 250 OVERRIDE (IP): Performed by: STUDENT IN AN ORGANIZED HEALTH CARE EDUCATION/TRAINING PROGRAM

## 2024-03-02 PROCEDURE — 700111 HCHG RX REV CODE 636 W/ 250 OVERRIDE (IP): Mod: JZ | Performed by: HOSPITALIST

## 2024-03-02 PROCEDURE — 99232 SBSQ HOSP IP/OBS MODERATE 35: CPT | Performed by: NURSE PRACTITIONER

## 2024-03-02 PROCEDURE — 700105 HCHG RX REV CODE 258: Performed by: HOSPITALIST

## 2024-03-02 PROCEDURE — A9270 NON-COVERED ITEM OR SERVICE: HCPCS | Performed by: INTERNAL MEDICINE

## 2024-03-02 PROCEDURE — 700102 HCHG RX REV CODE 250 W/ 637 OVERRIDE(OP): Performed by: INTERNAL MEDICINE

## 2024-03-02 PROCEDURE — C9113 INJ PANTOPRAZOLE SODIUM, VIA: HCPCS | Performed by: STUDENT IN AN ORGANIZED HEALTH CARE EDUCATION/TRAINING PROGRAM

## 2024-03-02 PROCEDURE — 83735 ASSAY OF MAGNESIUM: CPT

## 2024-03-02 PROCEDURE — 80048 BASIC METABOLIC PNL TOTAL CA: CPT

## 2024-03-02 PROCEDURE — 36415 COLL VENOUS BLD VENIPUNCTURE: CPT

## 2024-03-02 PROCEDURE — 700102 HCHG RX REV CODE 250 W/ 637 OVERRIDE(OP): Performed by: HOSPITALIST

## 2024-03-02 PROCEDURE — 99233 SBSQ HOSP IP/OBS HIGH 50: CPT | Performed by: STUDENT IN AN ORGANIZED HEALTH CARE EDUCATION/TRAINING PROGRAM

## 2024-03-02 PROCEDURE — 85027 COMPLETE CBC AUTOMATED: CPT | Mod: 91

## 2024-03-02 PROCEDURE — 770020 HCHG ROOM/CARE - TELE (206)

## 2024-03-02 RX ORDER — PANTOPRAZOLE SODIUM 40 MG/10ML
40 INJECTION, POWDER, LYOPHILIZED, FOR SOLUTION INTRAVENOUS 2 TIMES DAILY
Status: DISCONTINUED | OUTPATIENT
Start: 2024-03-02 | End: 2024-03-03 | Stop reason: HOSPADM

## 2024-03-02 RX ADMIN — CARIPRAZINE 3 MG: 3 CAPSULE, GELATIN COATED ORAL at 05:40

## 2024-03-02 RX ADMIN — SUCRALFATE ORAL 1 G: 1 SUSPENSION ORAL at 05:40

## 2024-03-02 RX ADMIN — PIPERACILLIN AND TAZOBACTAM 3.38 G: 3; .375 INJECTION, POWDER, FOR SOLUTION INTRAVENOUS at 12:51

## 2024-03-02 RX ADMIN — SODIUM CHLORIDE: 9 INJECTION, SOLUTION INTRAVENOUS at 12:54

## 2024-03-02 RX ADMIN — SODIUM CHLORIDE: 9 INJECTION, SOLUTION INTRAVENOUS at 01:14

## 2024-03-02 RX ADMIN — PANTOPRAZOLE SODIUM 40 MG: 40 INJECTION, POWDER, FOR SOLUTION INTRAVENOUS at 08:52

## 2024-03-02 RX ADMIN — PANTOPRAZOLE SODIUM 40 MG: 40 INJECTION, POWDER, FOR SOLUTION INTRAVENOUS at 17:24

## 2024-03-02 RX ADMIN — SUCRALFATE ORAL 1 G: 1 SUSPENSION ORAL at 00:20

## 2024-03-02 RX ADMIN — SUCRALFATE ORAL 1 G: 1 SUSPENSION ORAL at 12:47

## 2024-03-02 RX ADMIN — PANTOPRAZOLE SODIUM 8 MG/HR: 40 INJECTION, POWDER, FOR SOLUTION INTRAVENOUS at 01:12

## 2024-03-02 RX ADMIN — PIPERACILLIN AND TAZOBACTAM 3.38 G: 3; .375 INJECTION, POWDER, FOR SOLUTION INTRAVENOUS at 05:40

## 2024-03-02 RX ADMIN — SUCRALFATE ORAL 1 G: 1 SUSPENSION ORAL at 17:24

## 2024-03-02 ASSESSMENT — ENCOUNTER SYMPTOMS
DIARRHEA: 0
DEPRESSION: 0
ABDOMINAL PAIN: 0
HEMOPTYSIS: 0
VOMITING: 0
DIZZINESS: 0
NAUSEA: 0
WEAKNESS: 0
CHILLS: 0
BLURRED VISION: 0
CONSTIPATION: 0
SHORTNESS OF BREATH: 0
BACK PAIN: 0
COUGH: 0
HEARTBURN: 1
HEARTBURN: 0
BLOOD IN STOOL: 0
FEVER: 0

## 2024-03-02 ASSESSMENT — PAIN DESCRIPTION - PAIN TYPE
TYPE: ACUTE PAIN
TYPE: ACUTE PAIN

## 2024-03-02 ASSESSMENT — PAIN SCALES - GENERAL: PAIN_LEVEL: 0

## 2024-03-02 NOTE — PROCEDURES
OPERATIVE REPORT    PATIENT:   Sunita Thornton   1995       PREOPERATIVE DIAGNOSES/INDICATIONS: hematemesis and melena    POSTOPERATIVE DIAGNOSIS: LA class B erosive esophagitis, Raina Tamayo tear with pigmented spot    PROCEDURE:  ESOPHAGOGASTRODUODENOSCOPY    PHYSICIAN:  Mervat Mcdonald MD    ANESTHESIA:  Per anesthesiologist.    LOCATION: [facility]    CONSENT:  OBTAINED. The risks, benefits and alternatives of the procedure were discussed in details. The risks include and are not limited to bleeding, infection, perforation, missed lesions, and sedations risks (cardiopulmonary compromise and allergic reaction to medications).    DESCRIPTION: The patient presented to the procedure room.  After routine checkup was performed, patient was brought into the endoscopy suite.  Patient was placed on her left lateral decubitus position. A bite block was placed in patient's mouth. Patient was sedated by anesthesia.  Vital signs were monitored throughout the procedure.  Oxygenation support was provided throughout the procedure. Upper endoscope was inserted into patient's mouth and advanced to the second portion of the duodenum under direct visualization.      Once the site was reached and examined, the upper endoscope was withdrawn.  Retroflexion was made within the stomach.  The stomach was decompressed, scope was withdrawn and the procedure was terminated.     The patient tolerated the procedure well.  There were no immediate complications.    OPERATIVE FINDINGS:    1. Esophagus: linear erosions distal 1/3, consistent with LA class B.  2. Stomach: at the GE junction, linear tear with 1mm pigmented spot at 3:00.  Difficult to reach with hemoclip and scope malfunction preventing retroflexion.  No blood in lumen  3. Duodenum: normal    RECOMMENDATIONS:  Clear liq diet.  Adv to full liq in am  Continue IV PPI BID and add Carafate slurry  Avoid nausea  Serial H&H  No PO meds x 18 hours

## 2024-03-02 NOTE — PROGRESS NOTES
..Gastroenterology Progress Note               Author:  Lindsey Magana DNP,  GÓMEZ Date & Time Created: 3/2/2024 9:04 AM       Patient ID:  Name:             Sunita Thornton    YOB: 1995  Age:                 28 y.o.  female  MRN:               [id number]        Medical Decision Making, by Problem:  Active Hospital Problems    Diagnosis     Esophageal rupture [K22.3]     Nausea and vomiting [R11.2]     Hematemesis [K92.0]     Schizophrenia (HCC) [F20.9]     Leukocytosis [D72.829]     Tachycardia [R00.0]     Hypomagnesemia [E83.42]     High anion gap metabolic acidosis [E87.29]     Hyperglycemia [R73.9]     Acute blood loss anemia [D62]     Nicotine use [Z72.0]            Presenting Chief Complaint:  Hematemesis    HPI:  This is a 28 year old female who presented from OSH with acute onset of vomiting with hematemesis and associated dizziness. She was tachycardic and hypotensive on arrival with hgb 11.6 and BUN 39.    EGD 3/1 revealed LA grade B erosive esophagitis and Raina Tamayo tear with pigmented spot    Interval History:  3/2/2024: Patient seen. No complaints, no further episodes of nausea or vomiting. Tolerating liquids. Tachycardic and normotensive. Hgb 9.5    Hospital Medications:  Current Facility-Administered Medications   Medication Dose Frequency Provider Last Rate Last Admin    pantoprazole (Protonix) injection 40 mg  40 mg BID Magda Ty M.D.   40 mg at 03/02/24 0852    NS infusion   Continuous Dave Gonzalez M.D. 83 mL/hr at 03/02/24 0114 New Bag at 03/02/24 0114    hydrALAZINE (Apresoline) injection 10 mg  10 mg Q4HRS PRN Dave Gonzalez M.D.        ondansetron (Zofran) syringe/vial injection 4 mg  4 mg Q4HRS PRN Dave Gonzalez M.D.        ondansetron (Zofran ODT) dispertab 4 mg  4 mg Q4HRS PRN Dave Gonzalez M.D.        promethazine (Phenergan) suppository 12.5-25 mg  12.5-25 mg Q4HRS PRN Dave Gonzalez M.D.        prochlorperazine (Compazine) injection 5-10 mg  5-10 mg Q4HRS  PRN Dave Gonzalez M.D.        fentaNYL (Sublimaze) injection 50 mcg  50 mcg Q HOUR PRN Dave Gonzalez M.D.        fentaNYL (Sublimaze) injection 25 mcg  25 mcg Q HOUR PRN Dave Gonzalez M.D.        cariprazine (Vraylar) capsule 3 mg  3 mg DAILY Dave Gonzalez M.D.   3 mg at 03/02/24 0540    piperacillin-tazobactam (Zosyn) 3.375 g in  mL IVPB  3.375 g Q8HRS Dave Gonzalez M.D. 25 mL/hr at 03/02/24 0540 3.375 g at 03/02/24 0540    sucralfate (Carafate) 1 GM/10ML suspension 1 g  1 g Q6HRS Mervat Mcdonald M.D.   1 g at 03/02/24 0540   Last reviewed on 3/1/2024  4:23 PM by Bernadette Daley T       Review of Systems:  Review of Systems   Constitutional:  Negative for chills, fever and malaise/fatigue.   HENT:  Negative for hearing loss.    Eyes:  Negative for blurred vision.   Respiratory:  Negative for cough and shortness of breath.    Cardiovascular:  Negative for chest pain and leg swelling.   Gastrointestinal:  Negative for abdominal pain, blood in stool, constipation, diarrhea, heartburn, melena, nausea and vomiting.   Genitourinary:  Negative for dysuria.   Musculoskeletal:  Negative for back pain.   Skin:  Negative for rash.   Neurological:  Negative for dizziness and weakness.   Psychiatric/Behavioral:  Negative for depression.    All other systems reviewed and are negative.        Vital signs:  Weight/BMI: There is no height or weight on file to calculate BMI.  /76   Pulse (!) 102   Temp 36.6 °C (97.9 °F) (Temporal)   Resp 16   Wt 75.2 kg (165 lb 12.6 oz)   SpO2 97%   Vitals:    03/01/24 2345 03/02/24 0438 03/02/24 0455 03/02/24 0808   BP: 94/57 100/58  115/76   Pulse:  93  (!) 102   Resp: (!) 87 17  16   Temp: 36.2 °C (97.1 °F) 36.7 °C (98 °F)  36.6 °C (97.9 °F)   TempSrc: Temporal Temporal  Temporal   SpO2: 96% 98%  97%   Weight:   75.2 kg (165 lb 12.6 oz)      Oxygen Therapy:  Pulse Oximetry: 97 %, O2 (LPM): 0, O2 Delivery Device: None - Room Air    Intake/Output Summary (Last 24 hours)  at 3/2/2024 0904  Last data filed at 3/2/2024 0455  Gross per 24 hour   Intake 340 ml   Output 1950 ml   Net -1610 ml         Physical Exam:  Physical Exam  Vitals and nursing note reviewed.   Constitutional:       General: She is not in acute distress.     Appearance: Normal appearance.   HENT:      Head: Normocephalic and atraumatic.      Right Ear: External ear normal.      Left Ear: External ear normal.      Nose: Nose normal.      Mouth/Throat:      Mouth: Mucous membranes are moist.      Pharynx: Oropharynx is clear.   Eyes:      General: No scleral icterus.  Cardiovascular:      Rate and Rhythm: Regular rhythm. Tachycardia present.      Pulses: Normal pulses.      Heart sounds: Normal heart sounds.   Pulmonary:      Effort: Pulmonary effort is normal. No respiratory distress.      Breath sounds: Normal breath sounds.   Abdominal:      General: Abdomen is flat. Bowel sounds are normal. There is no distension.      Palpations: Abdomen is soft.      Tenderness: There is no abdominal tenderness.   Musculoskeletal:         General: Normal range of motion.      Cervical back: Normal range of motion.   Skin:     General: Skin is warm and dry.      Capillary Refill: Capillary refill takes less than 2 seconds.   Neurological:      Mental Status: She is alert and oriented to person, place, and time.   Psychiatric:         Mood and Affect: Mood normal.         Behavior: Behavior normal.             Labs:  Recent Labs     03/02/24  0521   SODIUM 142   POTASSIUM 4.0   CHLORIDE 115*   CO2 16*   BUN 10   CREATININE 0.53   MAGNESIUM 2.2   CALCIUM 7.6*     Recent Labs     03/02/24  0521   GLUCOSE 118*     Recent Labs     03/01/24  1517 03/01/24  2119 03/02/24  0521   WBC 23.1* 14.1* 10.4     Recent Labs     03/01/24  1517 03/01/24  2119 03/02/24  0521   RBC 3.66* 3.24* 3.10*   HEMOGLOBIN 11.6* 10.2* 9.5*   HEMATOCRIT 33.2* 29.1* 29.1*   PLATELETCT 239 234 219     Recent Results (from the past 24 hour(s))   HCG Qualitative Ur     Collection Time: 03/01/24  3:10 PM   Result Value Ref Range    Beta-Hcg Urine Negative Negative   CBC WITHOUT DIFFERENTIAL    Collection Time: 03/01/24  3:17 PM   Result Value Ref Range    WBC 23.1 (H) 4.8 - 10.8 K/uL    RBC 3.66 (L) 4.20 - 5.40 M/uL    Hemoglobin 11.6 (L) 12.0 - 16.0 g/dL    Hematocrit 33.2 (L) 37.0 - 47.0 %    MCV 90.7 81.4 - 97.8 fL    MCH 31.7 27.0 - 33.0 pg    MCHC 34.9 32.2 - 35.5 g/dL    RDW 42.9 35.9 - 50.0 fL    Platelet Count 239 164 - 446 K/uL    MPV 10.4 9.0 - 12.9 fL   HEMOGLOBIN A1C    Collection Time: 03/01/24  3:17 PM   Result Value Ref Range    Glycohemoglobin 4.7 4.0 - 5.6 %    Est Avg Glucose 88 mg/dL   CBC WITHOUT DIFFERENTIAL    Collection Time: 03/01/24  9:19 PM   Result Value Ref Range    WBC 14.1 (H) 4.8 - 10.8 K/uL    RBC 3.24 (L) 4.20 - 5.40 M/uL    Hemoglobin 10.2 (L) 12.0 - 16.0 g/dL    Hematocrit 29.1 (L) 37.0 - 47.0 %    MCV 89.8 81.4 - 97.8 fL    MCH 31.5 27.0 - 33.0 pg    MCHC 35.1 32.2 - 35.5 g/dL    RDW 43.4 35.9 - 50.0 fL    Platelet Count 234 164 - 446 K/uL    MPV 10.3 9.0 - 12.9 fL   EXTRA TUBE,ZHEN    Collection Time: 03/01/24  9:19 PM   Result Value Ref Range    Extra Tube, Blue SORTED    Basic Metabolic Panel (BMP)    Collection Time: 03/02/24  5:21 AM   Result Value Ref Range    Sodium 142 135 - 145 mmol/L    Potassium 4.0 3.6 - 5.5 mmol/L    Chloride 115 (H) 96 - 112 mmol/L    Co2 16 (L) 20 - 33 mmol/L    Glucose 118 (H) 65 - 99 mg/dL    Bun 10 8 - 22 mg/dL    Creatinine 0.53 0.50 - 1.40 mg/dL    Calcium 7.6 (L) 8.5 - 10.5 mg/dL    Anion Gap 11.0 7.0 - 16.0   MAGNESIUM    Collection Time: 03/02/24  5:21 AM   Result Value Ref Range    Magnesium 2.2 1.5 - 2.5 mg/dL   CBC WITHOUT DIFFERENTIAL    Collection Time: 03/02/24  5:21 AM   Result Value Ref Range    WBC 10.4 4.8 - 10.8 K/uL    RBC 3.10 (L) 4.20 - 5.40 M/uL    Hemoglobin 9.5 (L) 12.0 - 16.0 g/dL    Hematocrit 29.1 (L) 37.0 - 47.0 %    MCV 93.9 81.4 - 97.8 fL    MCH 30.6 27.0 - 33.0 pg    MCHC 32.6 32.2  - 35.5 g/dL    RDW 45.0 35.9 - 50.0 fL    Platelet Count 219 164 - 446 K/uL    MPV 10.5 9.0 - 12.9 fL   ESTIMATED GFR    Collection Time: 03/02/24  5:21 AM   Result Value Ref Range    GFR (CKD-EPI) 130 >60 mL/min/1.73 m 2       Radiology Review:  No orders to display         MDM (Data Review):   -Records reviewed and summarized in current documentation  -I personally reviewed and interpreted the laboratory results  -I personally reviewed the radiology images    Assessment/Recommendations:  Hematemesis  Acute blood loss anemia  GERD  Schizophrenia  LA class B erosive esophagitis  Raina Tamayo tear with pigmented spot     Recommendations:  Full liquid diet today  Please crush all her medications  Continue PPI BID  If no further issues, anticipate discharge home tomorrow morning    Discussed with patient, RN, Dr. Ty, Dr. Mcdonald          Core Quality Measures   Reviewed items::  Labs, Medications and Radiology reports reviewed

## 2024-03-02 NOTE — ANESTHESIA POSTPROCEDURE EVALUATION
Patient: Sunita Thornton    Procedure Summary       Date: 03/01/24 Room / Location: [facility] ROOM 26 / SURGERY SAME DAY [facility]    Anesthesia Start: 1637 Anesthesia Stop: 1700    Procedure: GASTROSCOPY (Esophagus) Diagnosis: (Raina durham tear/ Esophagitis)    Surgeons: Mervat Mcdonald M.D. Responsible Provider: Abel Arredondo M.D.    Anesthesia Type: general ASA Status: 2 - Emergent            Final Anesthesia Type: general  Last vitals  BP   Blood Pressure: 100/58    Temp   36.7 °C (98 °F)    Pulse   93   Resp   17    SpO2   98 %      Anesthesia Post Evaluation    Patient location during evaluation: PACU  Patient participation: complete - patient participated  Level of consciousness: awake and alert  Pain score: 0    Airway patency: patent  Anesthetic complications: no  Cardiovascular status: hemodynamically stable  Respiratory status: acceptable  Hydration status: euvolemic    PONV: none          No notable events documented.     Nurse Pain Score: 0 (NPRS)

## 2024-03-02 NOTE — ANESTHESIA PROCEDURE NOTES
Airway    Date/Time: 3/1/2024 4:43 PM    Performed by: Abel Arredondo M.D.  Authorized by: Abel Arredondo M.D.    Location:  OR  Urgency:  Elective  Indications for Airway Management:  Anesthesia      Spontaneous Ventilation: absent    Sedation Level:  Deep  Preoxygenated: Yes    Patient Position:  Sniffing  Mask Difficulty Assessment:  0 - not attempted  Final Airway Type:  Endotracheal airway  Final Endotracheal Airway:  ETT  Cuffed: Yes    Technique Used for Successful ETT Placement:  Direct laryngoscopy  Devices/Methods Used in Placement:  Cricoid pressure    Insertion Site:  Oral  Blade Type:  [place]  Laryngoscope Blade/Videolaryngoscope Blade Size:  3  ETT Size (mm):  7.0  Measured from:  Teeth  ETT to Teeth (cm):  21  Placement Verified by: auscultation and capnometry    Cormack-Lehane Classification:  Grade I - full view of glottis  Number of Attempts at Approach:  1   RSI

## 2024-03-02 NOTE — ANESTHESIA TIME REPORT
Anesthesia Start and Stop Event Times       Date Time Event    3/1/2024 1555 Ready for Procedure     1637 Anesthesia Start     1700 Anesthesia Stop          Responsible Staff  03/01/24      Name Role Begin Jordyn Arredondo M.D. Anesth 1637 1700          Overtime Reason:  no overtime (within assigned shift)    Comments:

## 2024-03-02 NOTE — HOSPITAL COURSE
Sunita Thornton is a 28 y.o. female who [place] 3[place]2024 [place] [facility]em[place].  The patient had some tacos night prior to presentation, soon after  she vomited blood.  During the night she vomited at least 9 times she says every time it was blood.  He said there was more blood in the toilet and water.  She has no pain with it.  She has been having gastric complaints with heartburn over the past 2 months.  Patient does not drink alcohol.  She does not use NSAIDs.     She has a white blood cell count of 22,000, H/H 11.6/33.2. she was started on IV protonix and GI was consulted. Given concern for esophageal perforation and elevated WBC she was placed on zosyn as well.   She underwent EGD on 3/1/2024 which showed linear erosions, and linear tear and GE junction, no active bleeding.

## 2024-03-02 NOTE — PROGRESS NOTES
Follow-up after EGD    Patient had small Raina-Tamayo tear at the GE junction.  GI at this point advancing diet to clear liquids.  Full liquids in the morning  Continue PPI therapy, continue Carafate  No oral meds x 18 hours  Monitor H&H every 8 hours

## 2024-03-02 NOTE — PROGRESS NOTES
[facility] Medicine Daily Progress Note    Date of Service  3/2/2024    Chief Complaint  Sunita Thornton is a 28 y.o. female admitted 3/1/2024 with GI bleed    Hospital Course  Sunita Thornton is a 28 y.o. female who presented 3/1/2024 on transfer from [facility] after the patient reported there for hematemesis.  The patient had some tacos night prior to presentation, soon after  she vomited blood.  During the night she vomited at least 9 times she says every time it was blood.  He said there was more blood in the toilet and water.  She has no pain with it.  She has been having gastric complaints with heartburn over the past 2 months.  Patient does not drink alcohol.  She does not use NSAIDs.     She has a white blood cell count of 22,000, H/H 11.6/33.2. she was started on IV protonix and GI was consulted. Given concern for esophageal perforation and elevated WBC she was placed on zosyn as well.   She underwent EGD on 3/1/2024 which showed linear erosions, and linear tear and GE junction, no active bleeding.         Interval Problem Update  Pt seen and examined, no complaints, no pain, denies melena   - Hgb stable ~9.5, decrease frequency   - advanced from CLD to Fulls today, only crushed meds   - stop IVF   - change continuous ppi to BID PPI  - discussed with GI     I have discussed this patient's plan of care and discharge plan at IDT rounds today with Case Management, Nursing, Nursing leadership, and other members of the IDT team.    Consultants/Specialty  GI    Code Status  Full Code    Disposition  Medically Cleared  I have placed the appropriate orders for post-discharge needs.    Review of Systems  ROS     Physical Exam  Temp:  [36.2 °C (97.1 °F)-37.4 °C (99.4 °F)] 36.7 °C (98 °F)  Pulse:  [] 103  Resp:  [14-87] 16  BP: ()/(57-86) 115/74  SpO2:  [96 %-99 %] 97 %    Physical Exam    Fluids    Intake/Output Summary (Last 24 hours) at 3/2/2024 1416  Last data filed at 3/2/2024 1213  Gross per  24 hour   Intake 340 ml   Output 3000 ml   Net -2660 ml       Laboratory  Recent Labs     03/01/24  2119 03/02/24  0521 03/02/24  1246   WBC 14.1* 10.4 11.6*   RBC 3.24* 3.10* 2.90*   HEMOGLOBIN 10.2* 9.5* 9.2*   HEMATOCRIT 29.1* 29.1* 27.0*   MCV 89.8 93.9 93.1   MCH 31.5 30.6 31.7   MCHC 35.1 32.6 34.1   RDW 43.4 45.0 45.4   PLATELETCT 234 219 192   MPV 10.3 10.5 10.7     Recent Labs     03/02/24  0521   SODIUM 142   POTASSIUM 4.0   CHLORIDE 115*   CO2 16*   GLUCOSE 118*   BUN 10   CREATININE 0.53   CALCIUM 7.6*                   Imaging  No orders to display        Assessment/Plan  * Esophageal rupture- (present on admission)  Assessment & Plan  Suspected esophageal rupture although this could be from gastric ulcer disease as well.  Patient tells me she has had heartburn for the past 2 months  She reports sudden vomiting of blood yesterday that came after she ate some tacos.  Patient has a not reported vomiting prior to this and did not report had any vomiting episodes.  H&H stable ~9  EGD without rupture, Raina-Tamayo tear at GE junction   PPI BID, full liquid diet and Carafate slurry     Nicotine use- (present on admission)  Assessment & Plan  Discussed nicotine use with her.  Patient says she vapes all the time.  She says she has been trying to cut back  Discussed nicotine replacement protocol with her currently she is not interested.  Discussed medication management for withdrawal as she states she is not interested at this time  Discussed acupuncture and she said most likely her insurance is not going to cover that.  Also the place she lives and has minimal resources.    Acute blood loss anemia- (present on admission)  Assessment & Plan  Secondary to the hematemesis patient has developed anemia   Continue to monitor H/H   Hgb ~9.5, stable x 2     Hyperglycemia- (present on admission)  Assessment & Plan  Hyperglycemia secondary to stress response   hemoglobin A1c level 4.7    High anion gap metabolic  acidosis- (present on admission)  Assessment & Plan  Secondary to the nausea vomiting patient has become acidotic and thus has developed metabolic acidosis with high anion gap  Monitor bicarb level as well as anion gap level and give fluid resuscitation  Hyperchloremic acidosis   Stop NS, pt tolerating full liquid diet   Repeat BMP in am     Hypomagnesemia- (present on admission)  Assessment & Plan  Magnesium 1.6 at the sending facility and thus we will give her 2 g of IV magnesium recheck magnesium level in the morning.    Tachycardia- (present on admission)  Assessment & Plan  Tachycardia most likely SIRS response with elevated white blood cell count  Initiateed antibiotics and fluid resuscitation  HR stable   Stop IVF, no signs of infection, stop antibiotics and monitor     Leukocytosis- (present on admission)  Assessment & Plan  Leukocytosis of 22,000  This could be from a stress response simultaneously with an ulcer versus a Mauro-Tamayo tear.  Was started on Zosyn and monitor white blood cell count  Follow-up on blood cultures from [facility]  Dc antibiotics, no other signs of infection, suspect reactive     Schizophrenia (HCC)- (present on admission)  Assessment & Plan  Patient takes Vraylar 3 mg daily, resume     Hematemesis- (present on admission)  Assessment & Plan  Sudden onset of hematemesis prior to arrival   Patient tells me she has had heartburn for several months  S/p EGD with mauro Argenis tear   Stop PPI infusion, start IV PPI BID   Full liquid diet, carafate slurry and only crushed meds, discussed with GI  Monitor H/H, stable ~9.5    Nausea and vomiting- (present on admission)  Assessment & Plan  Profound nausea vomiting that happened yesterday.  She says she has been vomiting all night long at least 10 times she has vomited every time it is blood.  Antiemetic management  Tolerating full liquid diet, stop IVF         VTE prophylaxis:    pharmacologic prophylaxis contraindicated due  to GI bleed      I have performed a physical exam and reviewed and updated ROS and Plan today (3/2/2024). In review of yesterday's note (3/1/2024), there are no changes except as documented above.

## 2024-03-02 NOTE — PROGRESS NOTES
PLEASE NOTE THIS PROGRESS NOTE WAS WRITTEN BY A MEDICAL STUDENT FOR EDUCATIONAL PURPOSES AND SHOULD NOT UTILIZED FOR BILLING.    Reason for admission: Sunita Thornton is a 28 y.o. female who was admitted on 3/1/24 with an upper GI bleed.     HD/POD#   HD #1    HOSPITAL COURSE  History of heartburn for the past six months which is associated with occasional vomiting when symptoms are severe. Reportedly takes tums as needed but denies use of an antacid. Two nights ago while eating tacos, she developed acute nausea and began experiencing hematemesis. No vomiting prior to this event and states her emesis was bloody as soon as it started. Throughout the night, she experienced at least nine episodes of hematemesis described as a blood filling the toilet bowl during each episode. No abdominal pain, chest pain or difficulty breathing was experienced at any point. However, she notes a single episode of melena. She presented to [facility] for evaluation the morning of 3/1/24. At that time, she was hypotensive at 85/46 and tachycardic at 147. Laboratory results revealed leukocytosis with WBC 22, anemia with Hg 11.6 and Hct 34.9, metabolic acidosis with CO2 19, lactic acidosis with lactic acid of 3, anion gap 19.5, low Mg 1.6, BUN/Cr ratio 36.79, BUN 39, Cr 1.06. Treated with Pantoprazole 40 mg IV, 1 L NS IV, Zofran 4 mg IV and Ceftriaxone 1 g IV. Given vital sign instability and GI bleed presentation, 1 unit of RBCs was administered. Transferred as a direct admit for GI evaluation of upper GI bleed. EGD completed on 3/1/24 revealing LA grade B erosive esophagitis and Raina Tamayo tear. GI recommended diet be advanced to full liquids with no PO medications, unless crushed, for 18 hours. Magnesium resuscitated with magnesium sulfate 2 g IV.    SUBJECTIVE  Hypotensive readings overnight as low as 91/63. Patient states she is feeling much better today. She is no longer feeling nauseous and denies any vomiting,  abdominal pain, melena, chest pain or shortness of breath. She was able to tolerate full fluids this morning without any difficulty. Denies any new medical complaints.     ROS  Review of Systems   Constitutional:  Negative for chills and fever.   Respiratory:  Negative for cough, hemoptysis and shortness of breath.    Cardiovascular:  Negative for chest pain.   Gastrointestinal:  Positive for heartburn (chronic history). Negative for abdominal pain, blood in stool, melena (one episode, resolved), nausea (resolved) and vomiting (hematemsis resolved).   Genitourinary:  Negative for dysuria.      OBJECTIVE   Vital Signs  Vitals:    03/01/24 2345 03/02/24 0438 03/02/24 0455 03/02/24 0808   BP: 94/57 100/58  115/76   Pulse:  93  (!) 102   Resp: (!) 87 17  16   Temp: 36.2 °C (97.1 °F) 36.7 °C (98 °F)  36.6 °C (97.9 °F)   TempSrc: Temporal Temporal  Temporal   SpO2: 96% 98%  97%   Weight:   75.2 kg (165 lb 12.6 oz)      Physical Exam   General: Awake, Sitting up in bed reading her consuelo. No apparent distress.   HEENT: PERRLA. EOMI. Moist mucous membranes.  Cardiovascular: Regular rate and rhythm. No murmurs heard. Capillary refill <2 seconds.  Respiratory: Pulmonary effort is normal. Normal breath sounds auscultated.   Abdominal exam: Nontender, nondistended abdomen. No rebound or guarding.   Extremities: Moving extremities symmetrically. Radial and distal pulses intact.   Neurological: Alert and oriented to person, place and time.   Psychiatric: Normal affect. Cooperative and answering questions appropriately.     INS/OUTS    Intake/Output Summary (Last 24 hours) at 3/2/2024 1023  Last data filed at 3/2/2024 0455  Gross per 24 hour   Intake 340 ml   Output 1950 ml   Net -1610 ml       DIAGNOSTIC RESULTS  Laboratory testing and imaging were reviewed.    Labs  Recent Labs     03/01/24  1517 03/01/24  2119 03/02/24  0521   WBC 23.1* 14.1* 10.4   RBC 3.66* 3.24* 3.10*   HEMOGLOBIN 11.6* 10.2* 9.5*   HEMATOCRIT 33.2* 29.1*  29.1*   MCV 90.7 89.8 93.9   MCH 31.7 31.5 30.6   MCHC 34.9 35.1 32.6   RDW 42.9 43.4 45.0   PLATELETCT 239 234 219   MPV 10.4 10.3 10.5     Recent Labs     03/02/24  0521   SODIUM 142   POTASSIUM 4.0   CHLORIDE 115*   CO2 16*   GLUCOSE 118*   BUN 10   CREATININE 0.53   CALCIUM 7.6*               Imaging   No orders to display       ASSESSMENT/PLAN  Upper GI bleed  Anemia secondary to acute blood loss  Assessment & Plans  Chronic history of heartburn for six months with no regular NSAID use. EGD revealed LA grade 2 erosive esophagitis and Raina Tamayo tear. Received 1 unit of RBCs for anemia secondary to acute blood loss from hematemesis. Hg/Hct improved to 9.5 and 29.1, respectively. TEG ordered to evaluate platelet function.   Continue Pantoprazole and Sucralfate.  Continue Zosyn.  Diet advanced to full liquids today, tolerating well.  No PO medications unless crushed for at least 18 hours, per GI.    Metabolic acidosis  Assessment & Plan  Likely secondary to vomiting. Resuscitated with fluids.  Continue monitoring CMP.    Leukocytosis  Assessment & Plan  Resolved. Initial presentation with WBC of 22, now 10.4.  Continue monitoring CBC.  Continue Zosyn.    ISAURA  Assessment & Plan  BUN elevated at 39 on initial evaluation. Resolved with BUN 10 following fluid resuscitation.   Continue monitoring CMP for any changes.    Hyperglycemia  Assessment & Plan  Presented with elevated glucose of 165. Normal A1c 4.7%.     Hypotension  Assessment & Plan  Hypotensive on initial evaluation at 85/46. Hypotensive readings overnight but is now normotensive.  Continue monitoring BP.    Hypomagnesemia   Assessment & Plan  Normal at 2.2 following resuscitation with magnesium sulfate 2 g IV.    Schizophrenia  Assessment & Plan  Chronic, treated with Cariprazine.  Hold Cariprazine unless able to be crushed, awaiting pharmacy instructions.    PROPHYLAXIS  DVT: SCD; Hold Enoxaparin  GI: Pantoprazole       Overseeing Licensed Provider:  Dr. Magda Taveras  [facility]

## 2024-03-02 NOTE — OR NURSING
1656 received patient from OR. Report from Anesthesiologist and OR RN. Patient on 10L at 99 % O2. VSS. Monitor connected. Oral airway in place. S/P EGD    1515 updated patient's mother. Updated Dr. Mcdonald that patient's mother requesting a call.     1730 tolerating PO fluids, meets pacu d/c criteria     1737 attempted to give report     1750 report given to Jesse RN. Transported back to tele by RN on monitor.

## 2024-03-02 NOTE — PROGRESS NOTES
4 Eyes Skin Assessment Completed by CHIARA Molina and PANFILO Luis.    Head WDL  Ears WDL  Nose WDL  Mouth WDL  Neck WDL  Breast/Chest WDL  Shoulder Blades WDL  Spine WDL  (R) Arm/Elbow/Hand WDL  (L) Arm/Elbow/Hand WDL  Abdomen WDL  Groin WDL  Scrotum/Coccyx/Buttocks WDL  (R) Leg WDL  (L) Leg WDL  (R) Heel/Foot/Toe WDL  (L) Heel/Foot/Toe WDL          Devices In Places Tele Box, Blood Pressure Cuff, and Pulse Ox      Interventions In Place Pillows    Possible Skin Injury No    Pictures Uploaded Into Epic N/A  Wound Consult Placed N/A  RN Wound Prevention Protocol Ordered No

## 2024-03-02 NOTE — CARE PLAN
The patient is Watcher - Medium risk of patient condition declining or worsening     roblem: Knowledge Deficit - Standard  Goal: Patient and family/care givers will demonstrate understanding of plan of care, disease process/condition, diagnostic tests and medications  3/2/2024 0635 by Jes Wilson, R.N.  Outcome: Progressing  3/2/2024 0553 by Jes Wilson R.N.    Progress made toward(s) clinical / shift goals:  monitor H/H monitor post ops.     Patient is not progressing towards the following goals:    Discussed poc answered all current questions.

## 2024-03-03 ENCOUNTER — PHARMACY VISIT (OUTPATIENT)
Dept: PHARMACY | Facility: MEDICAL CENTER | Age: 29
End: 2024-03-03
Payer: COMMERCIAL

## 2024-03-03 VITALS
RESPIRATION RATE: 16 BRPM | HEART RATE: 70 BPM | SYSTOLIC BLOOD PRESSURE: 120 MMHG | OXYGEN SATURATION: 96 % | WEIGHT: 167.11 LBS | DIASTOLIC BLOOD PRESSURE: 79 MMHG | TEMPERATURE: 98.1 F

## 2024-03-03 LAB
ERYTHROCYTE [DISTWIDTH] IN BLOOD BY AUTOMATED COUNT: 43.8 FL (ref 35.9–50)
HCT VFR BLD AUTO: 27 % (ref 37–47)
HGB BLD-MCNC: 8.8 G/DL (ref 12–16)
MCH RBC QN AUTO: 30 PG (ref 27–33)
MCHC RBC AUTO-ENTMCNC: 32.6 G/DL (ref 32.2–35.5)
MCV RBC AUTO: 92.2 FL (ref 81.4–97.8)
PLATELET # BLD AUTO: 221 K/UL (ref 164–446)
PMV BLD AUTO: 10.6 FL (ref 9–12.9)
RBC # BLD AUTO: 2.93 M/UL (ref 4.2–5.4)
WBC # BLD AUTO: 8.5 K/UL (ref 4.8–10.8)

## 2024-03-03 PROCEDURE — 700102 HCHG RX REV CODE 250 W/ 637 OVERRIDE(OP): Performed by: HOSPITALIST

## 2024-03-03 PROCEDURE — 85027 COMPLETE CBC AUTOMATED: CPT

## 2024-03-03 PROCEDURE — A9270 NON-COVERED ITEM OR SERVICE: HCPCS | Performed by: INTERNAL MEDICINE

## 2024-03-03 PROCEDURE — C9113 INJ PANTOPRAZOLE SODIUM, VIA: HCPCS | Performed by: STUDENT IN AN ORGANIZED HEALTH CARE EDUCATION/TRAINING PROGRAM

## 2024-03-03 PROCEDURE — 700111 HCHG RX REV CODE 636 W/ 250 OVERRIDE (IP): Performed by: STUDENT IN AN ORGANIZED HEALTH CARE EDUCATION/TRAINING PROGRAM

## 2024-03-03 PROCEDURE — 700102 HCHG RX REV CODE 250 W/ 637 OVERRIDE(OP): Performed by: INTERNAL MEDICINE

## 2024-03-03 PROCEDURE — 36415 COLL VENOUS BLD VENIPUNCTURE: CPT

## 2024-03-03 PROCEDURE — 99232 SBSQ HOSP IP/OBS MODERATE 35: CPT | Performed by: NURSE PRACTITIONER

## 2024-03-03 PROCEDURE — 99239 HOSP IP/OBS DSCHRG MGMT >30: CPT | Performed by: STUDENT IN AN ORGANIZED HEALTH CARE EDUCATION/TRAINING PROGRAM

## 2024-03-03 PROCEDURE — RXMED WILLOW AMBULATORY MEDICATION CHARGE: Performed by: STUDENT IN AN ORGANIZED HEALTH CARE EDUCATION/TRAINING PROGRAM

## 2024-03-03 RX ORDER — SUCRALFATE ORAL 1 G/10ML
1 SUSPENSION ORAL EVERY 6 HOURS
Qty: 480 ML | Refills: 0 | Status: SHIPPED | OUTPATIENT
Start: 2024-03-03 | End: 2024-03-15

## 2024-03-03 RX ORDER — OMEPRAZOLE 20 MG/1
20 CAPSULE, DELAYED RELEASE ORAL 2 TIMES DAILY
Qty: 164 CAPSULE | Refills: 0 | Status: SHIPPED | OUTPATIENT
Start: 2024-03-03 | End: 2024-05-24

## 2024-03-03 RX ORDER — ONDANSETRON 4 MG/1
4 TABLET, ORALLY DISINTEGRATING ORAL EVERY 4 HOURS PRN
Qty: 10 TABLET | Refills: 0 | Status: SHIPPED | OUTPATIENT
Start: 2024-03-03

## 2024-03-03 RX ADMIN — SUCRALFATE ORAL 1 G: 1 SUSPENSION ORAL at 00:17

## 2024-03-03 RX ADMIN — PANTOPRAZOLE SODIUM 40 MG: 40 INJECTION, POWDER, FOR SOLUTION INTRAVENOUS at 04:26

## 2024-03-03 RX ADMIN — SUCRALFATE ORAL 1 G: 1 SUSPENSION ORAL at 04:26

## 2024-03-03 RX ADMIN — CARIPRAZINE 3 MG: 3 CAPSULE, GELATIN COATED ORAL at 04:26

## 2024-03-03 ASSESSMENT — ENCOUNTER SYMPTOMS
DEPRESSION: 0
VOMITING: 0
FEVER: 0
CONSTIPATION: 0
BLOOD IN STOOL: 0
BLURRED VISION: 0
WEAKNESS: 0
NAUSEA: 0
SHORTNESS OF BREATH: 0
ABDOMINAL PAIN: 0
COUGH: 0
HEARTBURN: 0
CHILLS: 0
BACK PAIN: 0
DIZZINESS: 0
DIARRHEA: 0

## 2024-03-03 ASSESSMENT — PAIN DESCRIPTION - PAIN TYPE: TYPE: ACUTE PAIN

## 2024-03-03 NOTE — CARE PLAN
The patient is Stable - Low risk of patient condition declining or worsening    Shift Goals  Clinical Goals: monitor GI symptoms  Patient Goals: discharge  Family Goals: terry    Progress made toward(s) clinical / shift goals:      Problem: Knowledge Deficit - Standard  Goal: Patient and family/care givers will demonstrate understanding of plan of care, disease process/condition, diagnostic tests and medications  Outcome: Progressing     Problem: Hemodynamics  Goal: Patient's hemodynamics, fluid balance and neurologic status will be stable or improve  Outcome: Progressing       Patient is not progressing towards the following goals:

## 2024-03-03 NOTE — PROGRESS NOTES
..Gastroenterology Progress Note               Author:  Lindsey Magana DNP,  GÓMEZ Date & Time Created: 3/3/2024 7:44 AM       Patient ID:  Name:             Sunita Thornton    YOB: 1995  Age:                 28 y.o.  female  MRN:               [id number]        Medical Decision Making, by Problem:  Active Hospital Problems    Diagnosis     Esophageal rupture [K22.3]     Nausea and vomiting [R11.2]     Hematemesis [K92.0]     Schizophrenia (HCC) [F20.9]     Leukocytosis [D72.829]     Tachycardia [R00.0]     Hypomagnesemia [E83.42]     High anion gap metabolic acidosis [E87.29]     Hyperglycemia [R73.9]     Acute blood loss anemia [D62]     Nicotine use [Z72.0]            Presenting Chief Complaint:  Hematemesis    HPI:  This is a 28 year old female who presented from OSH with acute onset of vomiting with hematemesis and associated dizziness. She was tachycardic and hypotensive on arrival with hgb 11.6 and BUN 39.    EGD 3/1 revealed LA grade B erosive esophagitis and Raina Tamayo tear with pigmented spot    Interval History:  3/3/2024: Patient seen. Tolerating full liquid diet. Hgb 88 but BUN 10    3/2/2024: Patient seen. No complaints, no further episodes of nausea or vomiting. Tolerating liquids. Tachycardic and normotensive. Hgb 9.5    Hospital Medications:  Current Facility-Administered Medications   Medication Dose Frequency Provider Last Rate Last Admin    pantoprazole (Protonix) injection 40 mg  40 mg BID Magda Ty M.D.   40 mg at 03/03/24 0426    hydrALAZINE (Apresoline) injection 10 mg  10 mg Q4HRS PRN Dave Gonzalez M.D.        ondansetron (Zofran) syringe/vial injection 4 mg  4 mg Q4HRS PRN Dave Gonzalez M.D.        ondansetron (Zofran ODT) dispertab 4 mg  4 mg Q4HRS PRN Dave Gonzalez M.D.        promethazine (Phenergan) suppository 12.5-25 mg  12.5-25 mg Q4HRS PRN Dave Gonzalez M.D.        prochlorperazine (Compazine) injection 5-10 mg  5-10 mg Q4HRS PRN Dave Gonzalez M.D.         cariprazine (Vraylar) capsule 3 mg  3 mg DAILY Dave Gonzalez M.D.   3 mg at 03/03/24 0426    sucralfate (Carafate) 1 GM/10ML suspension 1 g  1 g Q6HRS Mervat Mcdonald M.D.   1 g at 03/03/24 0426   Last reviewed on 3/1/2024  4:23 PM by Bernadette Daley, T       Review of Systems:  Review of Systems   Constitutional:  Negative for chills, fever and malaise/fatigue.   HENT:  Negative for hearing loss.    Eyes:  Negative for blurred vision.   Respiratory:  Negative for cough and shortness of breath.    Cardiovascular:  Negative for chest pain and leg swelling.   Gastrointestinal:  Negative for abdominal pain, blood in stool, constipation, diarrhea, heartburn, melena, nausea and vomiting.   Genitourinary:  Negative for dysuria.   Musculoskeletal:  Negative for back pain.   Skin:  Negative for rash.   Neurological:  Negative for dizziness and weakness.   Psychiatric/Behavioral:  Negative for depression.    All other systems reviewed and are negative.        Vital signs:  Weight/BMI: There is no height or weight on file to calculate BMI.  /79   Pulse 70   Temp 36.7 °C (98.1 °F) (Temporal)   Resp 16   Wt 75.8 kg (167 lb 1.7 oz)   SpO2 96%   Vitals:    03/02/24 2123 03/02/24 2353 03/03/24 0448 03/03/24 0706   BP: 112/72 117/75 108/75 120/79   Pulse:  92 90 70   Resp:  17 16 16   Temp:  36.6 °C (97.9 °F) 36.2 °C (97.2 °F) 36.7 °C (98.1 °F)   TempSrc:  Temporal Temporal Temporal   SpO2:  97% 96% 96%   Weight:   75.8 kg (167 lb 1.7 oz)      Oxygen Therapy:  Pulse Oximetry: 96 %, O2 (LPM): 0, O2 Delivery Device: None - Room Air    Intake/Output Summary (Last 24 hours) at 3/3/2024 0744  Last data filed at 3/3/2024 0433  Gross per 24 hour   Intake 920 ml   Output 1500 ml   Net -580 ml         Physical Exam:  Physical Exam  Vitals and nursing note reviewed.   Constitutional:       General: She is not in acute distress.     Appearance: Normal appearance.   HENT:      Head: Normocephalic and atraumatic.       Right Ear: External ear normal.      Left Ear: External ear normal.      Nose: Nose normal.      Mouth/Throat:      Mouth: Mucous membranes are moist.      Pharynx: Oropharynx is clear.   Eyes:      General: No scleral icterus.  Cardiovascular:      Rate and Rhythm: Regular rhythm. Tachycardia present.      Pulses: Normal pulses.      Heart sounds: Normal heart sounds.   Pulmonary:      Effort: Pulmonary effort is normal. No respiratory distress.      Breath sounds: Normal breath sounds.   Abdominal:      General: Abdomen is flat. Bowel sounds are normal. There is no distension.      Palpations: Abdomen is soft.      Tenderness: There is no abdominal tenderness.   Musculoskeletal:         General: Normal range of motion.      Cervical back: Normal range of motion.   Skin:     General: Skin is warm and dry.      Capillary Refill: Capillary refill takes less than 2 seconds.   Neurological:      Mental Status: She is alert and oriented to person, place, and time.   Psychiatric:         Mood and Affect: Mood normal.         Behavior: Behavior normal.             Labs:  Recent Labs     03/02/24  0521   SODIUM 142   POTASSIUM 4.0   CHLORIDE 115*   CO2 16*   BUN 10   CREATININE 0.53   MAGNESIUM 2.2   CALCIUM 7.6*     Recent Labs     03/02/24  0521   GLUCOSE 118*     Recent Labs     03/02/24  0521 03/02/24  1246 03/03/24  0109   WBC 10.4 11.6* 8.5     Recent Labs     03/02/24  0521 03/02/24  1246 03/03/24  0109   RBC 3.10* 2.90* 2.93*   HEMOGLOBIN 9.5* 9.2* 8.8*   HEMATOCRIT 29.1* 27.0* 27.0*   PLATELETCT 219 192 221     Recent Results (from the past 24 hour(s))   CBC WITHOUT DIFFERENTIAL    Collection Time: 03/02/24 12:46 PM   Result Value Ref Range    WBC 11.6 (H) 4.8 - 10.8 K/uL    RBC 2.90 (L) 4.20 - 5.40 M/uL    Hemoglobin 9.2 (L) 12.0 - 16.0 g/dL    Hematocrit 27.0 (L) 37.0 - 47.0 %    MCV 93.1 81.4 - 97.8 fL    MCH 31.7 27.0 - 33.0 pg    MCHC 34.1 32.2 - 35.5 g/dL    RDW 45.4 35.9 - 50.0 fL    Platelet Count 192 164  - 446 K/uL    MPV 10.7 9.0 - 12.9 fL   CBC WITHOUT DIFFERENTIAL    Collection Time: 03/03/24  1:09 AM   Result Value Ref Range    WBC 8.5 4.8 - 10.8 K/uL    RBC 2.93 (L) 4.20 - 5.40 M/uL    Hemoglobin 8.8 (L) 12.0 - 16.0 g/dL    Hematocrit 27.0 (L) 37.0 - 47.0 %    MCV 92.2 81.4 - 97.8 fL    MCH 30.0 27.0 - 33.0 pg    MCHC 32.6 32.2 - 35.5 g/dL    RDW 43.8 35.9 - 50.0 fL    Platelet Count 221 164 - 446 K/uL    MPV 10.6 9.0 - 12.9 fL       Radiology Review:  No orders to display         MDM (Data Review):   -Records reviewed and summarized in current documentation  -I personally reviewed and interpreted the laboratory results  -I personally reviewed the radiology images    Assessment/Recommendations:  Hematemesis  Acute blood loss anemia  GERD  Schizophrenia  LA class B erosive esophagitis  Raina Tamayo tear with pigmented spot     Recommendations:  Advance to regular diet  Continue PPI BID x 12 weeks  OK to discharge from GI standpoint    Discussed with patient, RN, Dr. Ty, Dr. Mcdonald      Core Quality Measures   Reviewed items::  Labs, Medications and Radiology reports reviewed

## 2024-03-03 NOTE — PROGRESS NOTES
Bedside report received from off going RN/tech: Jesse, assumed care of patient.     Fall Risk Score: NO RISK  Fall risk interventions in place: Provide patient/family education based on risk assessment and Place fall precaution signage outside patient door  Bed type: Regular (Michael Score less than 17 interventions in place)  Patient on cardiac monitor: Yes  IVF/IV medications: Not Applicable   Oxygen: Room Air  Bedside sitter: Not Applicable   Isolation: Not applicable

## 2024-03-03 NOTE — PROGRESS NOTES
Bedside report received from off going RN/tech: Maritza, assumed care of patient.     Fall Risk Score: NO RISK  Fall risk interventions in place: Provide patient/family education based on risk assessment  Bed type: Regular (Michael Score less than 17 interventions in place)  Patient on cardiac monitor: Yes  IVF/IV medications: Not Applicable   Oxygen: Room Air  Bedside sitter: Not Applicable   Isolation: Not applicable

## 2024-03-03 NOTE — CARE PLAN
The patient is Watcher - Medium risk of patient condition declining or worsening         Progress made toward(s) clinical / shift goals:    Problem: Knowledge Deficit - Standard  Goal: Patient and family/care givers will demonstrate understanding of plan of care, disease process/condition, diagnostic tests and medications  Outcome: Progressing  Note: Discuss and review POC with patient/family. Re-educate as needed.       Problem: Hemodynamics  Goal: Patient's hemodynamics, fluid balance and neurologic status will be stable or improve  Outcome: Progressing  Note: Hgb has been stable post EGD, BP has been 115/70s       Patient is not progressing towards the following goals:

## 2024-03-03 NOTE — DISCHARGE INSTRUCTIONS
You have bene diagnosed with a Raina-durham tear of your esophagus. You will need to take omeprazole twice daily for 12 weeks and carafate every 6 hours for a total of 2 weeks. Both have been prescribed   Recommend a soft diet for the next 1-2 weeks to help avoid any further irritation and allow for healing   If you develop any recurrent nausea, vomiting, blood in your stool, chest pain, dizziness or other concerning symptoms seek medical attention

## 2024-03-03 NOTE — DISCHARGE SUMMARY
Discharge Summary    CHIEF COMPLAINT ON ADMISSION  No chief complaint on file.      Reason for Admission  GI Bleed     Admission Date  3/1/2024    CODE STATUS  Full Code    HPI & HOSPITAL COURSE    Sunita Thornton is a 28 y.o. female who presented 3/1/2024 on transfer from [facility] after the patient reported there for hematemesis.  The patient had some tacos night prior to presentation, soon after she vomited blood.  During the night she vomited at least 9 times she says every time it was blood.  He said there was more blood in the toilet and water.  She has no pain with it.  She has been having gastric complaints with heartburn over the past 2 months.  Patient does not drink alcohol.  She does not use NSAIDs.    She has a white blood cell count of 22,000, H/H 11.6/33.2. she was started on IV protonix and GI was consulted. Given concern for esophageal perforation and elevated WBC she was placed on zosyn as well.   She underwent EGD on 3/1/2024 which showed linear erosions, and linear tear and GE junction, no active bleeding. She was started on carafate and continued on PPI therapy. Her diet was slowly advanced. She had no further evidence of bleeding or symptoms thus she was discharged home with 2 weeks of carafate and 12 weeks of PPI therapy per GI recommendations. She was given strict return precautions.      Therefore, she is discharged in fair and stable condition to home with close outpatient follow-up.    The patient met 2-midnight criteria for an inpatient stay at the time of discharge.    Discharge Date  3/3/2024    FOLLOW UP ITEMS POST DISCHARGE  ANemia       DISCHARGE DIAGNOSES  Principal Problem:    Esophageal rupture (POA: Yes)  Active Problems:    Nausea and vomiting (POA: Yes)    Hematemesis (POA: Yes)    Schizophrenia (HCC) (POA: Yes)    Leukocytosis (POA: Yes)    Tachycardia (POA: Yes)    Hypomagnesemia (POA: Yes)    High anion gap metabolic acidosis (POA: Yes)    Hyperglycemia (POA:  Yes)    Acute blood loss anemia (POA: Yes)    Nicotine use (POA: Yes)  Resolved Problems:    * No resolved hospital problems. *      FOLLOW UP  No future appointments.  No follow-up provider specified.    MEDICATIONS ON DISCHARGE     Medication List        START taking these medications        Instructions   omeprazole 20 MG delayed-release capsule  Commonly known as: PriLOSEC   Take 1 Capsule by mouth 2 times a day for 82 days.  Dose: 20 mg     ondansetron 4 MG Tbdp  Commonly known as: Zofran ODT   Take 1 Tablet by mouth every four hours as needed for Nausea/Vomiting (give PO if no IV route available).  Dose: 4 mg     sucralfate 1 GM/10ML Susp  Commonly known as: Carafate   Take 10 mL by mouth every 6 hours for 12 days.  Dose: 1 g            CONTINUE taking these medications        Instructions   Vraylar 3 MG capsule  Generic drug: cariprazine   Take 3 mg by mouth every day.  Dose: 3 mg              Allergies  Not on File    DIET  Orders Placed This Encounter   Procedures    Diet Order Diet: Regular     Standing Status:   Standing     Number of Occurrences:   1     Order Specific Question:   Diet:     Answer:   Regular [1]       ACTIVITY  As tolerated.      CONSULTATIONS  Gastroenterology     PROCEDURES  EGD on 3/1/2024    LABORATORY  Lab Results   Component Value Date    SODIUM 142 03/02/2024    POTASSIUM 4.0 03/02/2024    CHLORIDE 115 (H) 03/02/2024    CO2 16 (L) 03/02/2024    GLUCOSE 118 (H) 03/02/2024    BUN 10 03/02/2024    CREATININE 0.53 03/02/2024        Lab Results   Component Value Date    WBC 8.5 03/03/2024    HEMOGLOBIN 8.8 (L) 03/03/2024    HEMATOCRIT 27.0 (L) 03/03/2024    PLATELETCT 221 03/03/2024        Total time of the discharge process exceeds 31 minutes.

## (undated) DEVICE — SENSOR OXIMETER ADULT SPO2 RD SET (20EA/BX)

## (undated) DEVICE — KIT ANESTHESIA W/CIRCUIT & 3/LT BAG W/FILTER (20EA/CA)

## (undated) DEVICE — TUBE E-T HI-LO CUFF 7.0MM (10EA/PK)

## (undated) DEVICE — FILM CASSETTE ENDO

## (undated) DEVICE — BUTTON ENDOSCOPY DISPOSABLE

## (undated) DEVICE — SODIUM CHL IRRIGATION 0.9% 1000ML (12EA/CA)

## (undated) DEVICE — BLOCK BITE ENDOSCOPIC 2809 - (100/BX) INTERMEDIATE

## (undated) DEVICE — WATER IRRIGATION STERILE 1000ML (12EA/CA)

## (undated) DEVICE — TOWEL STOP TIMEOUT SAFETY FLAG (40EA/CA)

## (undated) DEVICE — ELECTRODE 850 FOAM ADHESIVE - HYDROGEL RADIOTRNSPRNT (50/PK)

## (undated) DEVICE — TUBE CONNECTING SUCTION - CLEAR PLASTIC STERILE 72 IN (50EA/CA)

## (undated) DEVICE — KIT CUSTOM PROCEDURE SINGLE FOR ENDO  (15/CA)

## (undated) DEVICE — NEPTUNE 4 PORT MANIFOLD - (20/PK)

## (undated) DEVICE — SUCTION INSTRUMENT YANKAUER BULBOUS TIP W/O VENT (50EA/CA)

## (undated) DEVICE — PORT AUXILLARY WATER (50EA/BX)

## (undated) DEVICE — SET LEADWIRE 5 LEAD BEDSIDE DISPOSABLE ECG (1SET OF 5/EA)

## (undated) DEVICE — LACTATED RINGERS INJ 1000 ML - (14EA/CA 60CA/PF)

## (undated) DEVICE — CANISTER SUCTION RIGID RED 1500CC (40EA/CA)